# Patient Record
Sex: MALE | Race: BLACK OR AFRICAN AMERICAN | NOT HISPANIC OR LATINO | Employment: OTHER | URBAN - METROPOLITAN AREA
[De-identification: names, ages, dates, MRNs, and addresses within clinical notes are randomized per-mention and may not be internally consistent; named-entity substitution may affect disease eponyms.]

---

## 2019-03-04 ENCOUNTER — OFFICE VISIT (OUTPATIENT)
Dept: FAMILY MEDICINE | Facility: CLINIC | Age: 44
End: 2019-03-04
Payer: COMMERCIAL

## 2019-03-04 VITALS
TEMPERATURE: 98.1 F | HEIGHT: 66 IN | WEIGHT: 210 LBS | OXYGEN SATURATION: 99 % | DIASTOLIC BLOOD PRESSURE: 79 MMHG | BODY MASS INDEX: 33.75 KG/M2 | SYSTOLIC BLOOD PRESSURE: 113 MMHG | HEART RATE: 59 BPM

## 2019-03-04 DIAGNOSIS — Z23 NEED FOR PROPHYLACTIC VACCINATION AND INOCULATION AGAINST INFLUENZA: ICD-10-CM

## 2019-03-04 DIAGNOSIS — R53.83 FATIGUE, UNSPECIFIED TYPE: ICD-10-CM

## 2019-03-04 DIAGNOSIS — R06.83 SNORING: ICD-10-CM

## 2019-03-04 DIAGNOSIS — Z13.6 CARDIOVASCULAR SCREENING; LDL GOAL LESS THAN 160: ICD-10-CM

## 2019-03-04 DIAGNOSIS — J30.89 PERENNIAL ALLERGIC RHINITIS: Primary | ICD-10-CM

## 2019-03-04 DIAGNOSIS — R68.82 DECREASED LIBIDO: ICD-10-CM

## 2019-03-04 DIAGNOSIS — Z11.3 SCREEN FOR STD (SEXUALLY TRANSMITTED DISEASE): ICD-10-CM

## 2019-03-04 DIAGNOSIS — G47.19 EXCESSIVE DAYTIME SLEEPINESS: ICD-10-CM

## 2019-03-04 PROBLEM — E66.811 OBESITY, CLASS I, BMI 30-34.9: Status: ACTIVE | Noted: 2019-03-04

## 2019-03-04 LAB
CHOLEST SERPL-MCNC: 194 MG/DL
ERYTHROCYTE [DISTWIDTH] IN BLOOD BY AUTOMATED COUNT: 13.4 % (ref 10–15)
GLUCOSE SERPL-MCNC: 81 MG/DL (ref 70–99)
HCT VFR BLD AUTO: 45.1 % (ref 40–53)
HDLC SERPL-MCNC: 74 MG/DL
HGB BLD-MCNC: 15.1 G/DL (ref 13.3–17.7)
LDLC SERPL CALC-MCNC: 106 MG/DL
MCH RBC QN AUTO: 29 PG (ref 26.5–33)
MCHC RBC AUTO-ENTMCNC: 33.5 G/DL (ref 31.5–36.5)
MCV RBC AUTO: 87 FL (ref 78–100)
NONHDLC SERPL-MCNC: 120 MG/DL
PLATELET # BLD AUTO: 135 10E9/L (ref 150–450)
RBC # BLD AUTO: 5.21 10E12/L (ref 4.4–5.9)
TRIGL SERPL-MCNC: 72 MG/DL
TSH SERPL DL<=0.005 MIU/L-ACNC: 1.73 MU/L (ref 0.4–4)
WBC # BLD AUTO: 3.8 10E9/L (ref 4–11)

## 2019-03-04 PROCEDURE — 87491 CHLMYD TRACH DNA AMP PROBE: CPT | Performed by: FAMILY MEDICINE

## 2019-03-04 PROCEDURE — 86803 HEPATITIS C AB TEST: CPT | Performed by: FAMILY MEDICINE

## 2019-03-04 PROCEDURE — 0064U ANTB TP TOTAL&RPR IA QUAL: CPT | Performed by: FAMILY MEDICINE

## 2019-03-04 PROCEDURE — 87591 N.GONORRHOEAE DNA AMP PROB: CPT | Performed by: FAMILY MEDICINE

## 2019-03-04 PROCEDURE — 36415 COLL VENOUS BLD VENIPUNCTURE: CPT | Performed by: FAMILY MEDICINE

## 2019-03-04 PROCEDURE — 84443 ASSAY THYROID STIM HORMONE: CPT | Performed by: FAMILY MEDICINE

## 2019-03-04 PROCEDURE — 87389 HIV-1 AG W/HIV-1&-2 AB AG IA: CPT | Performed by: FAMILY MEDICINE

## 2019-03-04 PROCEDURE — 90471 IMMUNIZATION ADMIN: CPT | Performed by: FAMILY MEDICINE

## 2019-03-04 PROCEDURE — 80061 LIPID PANEL: CPT | Performed by: FAMILY MEDICINE

## 2019-03-04 PROCEDURE — 84403 ASSAY OF TOTAL TESTOSTERONE: CPT | Performed by: FAMILY MEDICINE

## 2019-03-04 PROCEDURE — 82947 ASSAY GLUCOSE BLOOD QUANT: CPT | Performed by: FAMILY MEDICINE

## 2019-03-04 PROCEDURE — 99214 OFFICE O/P EST MOD 30 MIN: CPT | Mod: 25 | Performed by: FAMILY MEDICINE

## 2019-03-04 PROCEDURE — 90686 IIV4 VACC NO PRSV 0.5 ML IM: CPT | Performed by: FAMILY MEDICINE

## 2019-03-04 PROCEDURE — 85027 COMPLETE CBC AUTOMATED: CPT | Performed by: FAMILY MEDICINE

## 2019-03-04 RX ORDER — CETIRIZINE HYDROCHLORIDE 10 MG/1
10 TABLET ORAL DAILY PRN
Qty: 365 TABLET | COMMUNITY
Start: 2019-03-04

## 2019-03-04 RX ORDER — FLUTICASONE PROPIONATE 50 MCG
2 SPRAY, SUSPENSION (ML) NASAL DAILY
Qty: 10 ML | Refills: 1 | Status: SHIPPED | OUTPATIENT
Start: 2019-03-04 | End: 2024-01-31

## 2019-03-04 ASSESSMENT — PAIN SCALES - GENERAL: PAINLEVEL: NO PAIN (0)

## 2019-03-04 ASSESSMENT — MIFFLIN-ST. JEOR: SCORE: 1780.3

## 2019-03-04 NOTE — PROGRESS NOTES
SUBJECTIVE:   Duran Santiago is a 43 year old male who presents to clinic today for the following health issues:      ALLERGIES     Onset: about 2 years    Description:   Nasal congestion: YES, nasal mucus from nose when temperature change (such as forced air heating, or cold nighttime air)  Sneezing: YES, especially when waking up in the morning  Red, itchy eyes: YES    Progression of Symptoms:  same worse    Accompanying Signs & Symptoms:  Cough: no  Wheezing: no  Rash: no  Sinus/facial pain: YES   History:   Is it seasonal: none   History of Asthma: no  Has allergy testing been done: no    Precipitating factors:   None    Alleviating factors:  None       Therapies Tried and outcome: drinking fluids, exercise regularly, no OTC meds      Decreased libido      Duration: over 1 year    Description (location/character/radiation): He says that this is mostly a decrease in interest in having sex. There is a little bit of ED, and most of this he considers to be related to feeling tired all the time.     Intensity:  Variable, off-and-on    Accompanying signs and symptoms: None    History (similar episodes/previous evaluation): None    Precipitating or alleviating factors: See below    Therapies tried and outcome: None     Tiredness      Duration: 9-10 months    Description (location/character/radiation): He finds it hard to do physical work.    Intensity:  Variable    Accompanying signs and symptoms: Questioning reveals that he snores heavily, enough that his wife complains about it.     History (similar episodes/previous evaluation): None    Precipitating or alleviating factors: None    Therapies tried and outcome: He goes to the gym and works out regularly. He notes that he has no difficulty doing the workouts, but it does not energize him later in the day.   THE EPWORTH SLEEPINESS SCALE    How likely are you to doze off or fall asleep in the following situations, in contrast to feeling just tired? This refers to your  "usual way of life in recent times. Even if you have not done some of these things recently try to work out how they would have affected you. Use the following scale to choose the most appropriate number for each situation:     0 = no chance of dozing  1 = slight chance of dozing  2 = moderate chance of dozing  3 = high chance of dozing    SITUATION        CHANCE OF DOZING  Sitting and reading       3  Watching TV        2  Sitting inactive in a public place (e.g a theater or a meeting)  2  As a passenger in a car for an hour without a break   1  Lying down to rest in the afternoon when circumstances permit 3  Sitting and talking to someone     0  Sitting quietly after a lunch without alcohol    3  In a car, while stopped for a few minutes in traffic   0     To check your sleepiness score, total the points    14    THE EPWORTH SLEEPINESS SCALE KEY    1 - 6   Congratulations, you are getting enough sleep!   7 - 8   Your score is average   9 and up  Seek the advice of a sleep specialist without delay      Past medical, family, and social histories, medications, and allergies are reviewed and updated in Epic.     ROS:  Constitutional, HEENT, cardiovascular, pulmonary, gi and gu systems are negative, except as otherwise noted.    This document serves as a record of the services and decisions personally performed by GALE DUNN. It was created on his/her behalf by Isabela Sheppard, a trained medical scribe. The creation of this document is based on the provider's statements to the medical scribe. Isabela hSeppard, March 5, 2019 10:57 AM    OBJECTIVE:     /79 (BP Location: Left arm, Patient Position: Chair, Cuff Size: Adult Large)   Pulse 59   Temp 98.1  F (36.7  C) (Oral)   Ht 1.676 m (5' 6\")   Wt 95.3 kg (210 lb)   SpO2 99%   BMI 33.89 kg/m    Body mass index is 33.89 kg/m .  GENERAL: healthy, alert and no distress  EYES: Eyes grossly normal to inspection, PERRL, EOMI, sclerae white and conjunctivae normal  RESP: " lungs clear to auscultation - no crackles or wheezes, no areas of dullness, no tachypnea  CV: Heart regular rate and rhythm without murmur, click or rub. No peripheral edema and peripheral pulses strong  MS: no gross musculoskeletal defects noted, no edema  SKIN: no suspicious lesions or rashes  NEURO: Normal strength and tone, sensory exam grossly normal, mentation intact, oriented times 3 and cranial nerves 2-12 intact  PSYCH: mentation appears normal, affect normal/bright       ASSESSMENT/PLAN:     (J30.89) Perennial allergic rhinitis  (primary encounter diagnosis)  Comment: Most of his symptoms sound like allergies.   Plan: cetirizine (ZYRTEC) 10 MG tablet, fluticasone         (FLONASE) 50 MCG/ACT nasal spray        I encourages him to use the nose spray continuously throughout the year, but he can decrease to one spray as symptoms improve. Follow up PRN.    (R53.83) Fatigue, unspecified type  (R68.82) Decreased libido  Comment: I am screening for a metabolic cause to his nonspecific complaint, but assuming the labs are normal, I believe this is a manifestation of the main problem described below.   Plan: CBC with platelets, TSH with free T4 reflex,         Glucose, Testosterone, total            (G47.19) Excessive daytime sleepiness  (R06.83) Snoring  Comment: I think he has WALI.   Plan: SLEEP EVALUATION & MANAGEMENT REFERRAL - [facility]          [phone] (Age 15 and up)            (Z11.3) Screen for STD (sexually transmitted disease)  Comment: Asymptomatic screening offered and accepted by the patient.   Plan: Chlamydia trachomatis PCR, Neisseria         gonorrhoeae PCR, Hepatitis C Screen Reflex to         HCV RNA Quant and Genotype, HIV Antigen         Antibody Combo, Treponema Abs w Reflex to RPR         and Titer            (Z13.6) CARDIOVASCULAR SCREENING; LDL GOAL LESS THAN 160  Comment: Fasting.  Plan: Lipid panel reflex to direct LDL Non-fasting             (Z23) Need for prophylactic vaccination and inoculation against influenza  Comment: Influenza vaccine offered and accepted by patient. He has received it before without problems.  Plan: HC FLU VAC PRESRV FREE QUAD SPLIT VIR 3+YRS IM            The information in this document, created by the medical scribe Isabela Sheppard for me, accurately reflects the services I personally performed and the decisions made by me. I have reviewed and approved this document for accuracy prior to leaving the patient care area.    Brock Still MD

## 2019-03-04 NOTE — PROGRESS NOTES

## 2019-03-04 NOTE — PATIENT INSTRUCTIONS
================================================================================  Normal Values   Blood pressure  <140/90 for most adults    <130/80 for some chronic diseases (ask your care team about yours)    BMI (body mass index)  18.5-25 kg/m2 (based on height and weight)     Thank you for visiting [facility]    Normal or non-critical lab and imaging results will be communicated to you by MyChart, letter or phone within 7 days.  If you do not hear from us within 10 days, please call the clinic. If you have a critical or abnormal lab result, we will notify you by phone as soon as possible.     If you have any questions regarding your visit please contact:     Team Comfort:   Clinic Hours Telephone Number   Dr. Brock Dorado Dr. [place] 7am-5pm  Monday - Friday [phone]  Heather RN  Nory RN  Akanksha RN   Pharmacy 8:00am-8pm Monday-Friday    9am-5pm Saturday-Sunday [phone]   Urgent Care 11am-9pm Monday-Friday        9am-5pm Saturday-Sunday [phone]     After hours, weekend or if you need to make an appointment with your primary provider please call [phone].   After Hours nurse advise: call [facility]: [phone]    Medication Refills:  Call your pharmacy and they will forward the refill to us. Please allow 3 business days for your refills to be completed.

## 2019-03-05 LAB
C TRACH DNA SPEC QL NAA+PROBE: NEGATIVE
HCV AB SERPL QL IA: NONREACTIVE
HIV 1+2 AB+HIV1 P24 AG SERPL QL IA: NONREACTIVE
N GONORRHOEA DNA SPEC QL NAA+PROBE: NEGATIVE
SPECIMEN SOURCE: NORMAL
SPECIMEN SOURCE: NORMAL
T PALLIDUM AB SER QL: NONREACTIVE

## 2019-03-06 LAB — TESTOST SERPL-MCNC: 436 NG/DL (ref 240–950)

## 2019-03-27 ENCOUNTER — OFFICE VISIT (OUTPATIENT)
Dept: OPTOMETRY | Facility: CLINIC | Age: 44
End: 2019-03-27
Payer: COMMERCIAL

## 2019-03-27 ENCOUNTER — APPOINTMENT (OUTPATIENT)
Dept: OPTOMETRY | Facility: CLINIC | Age: 44
End: 2019-03-27
Payer: COMMERCIAL

## 2019-03-27 DIAGNOSIS — H52.4 MYOPIA OF BOTH EYES WITH ASTIGMATISM AND PRESBYOPIA: Primary | ICD-10-CM

## 2019-03-27 DIAGNOSIS — H52.13 MYOPIA OF BOTH EYES WITH ASTIGMATISM AND PRESBYOPIA: Primary | ICD-10-CM

## 2019-03-27 DIAGNOSIS — H52.203 MYOPIA OF BOTH EYES WITH ASTIGMATISM AND PRESBYOPIA: Primary | ICD-10-CM

## 2019-03-27 DIAGNOSIS — H10.13 ALLERGIC CONJUNCTIVITIS OF BOTH EYES: ICD-10-CM

## 2019-03-27 PROCEDURE — 92340 FIT SPECTACLES MONOFOCAL: CPT | Performed by: OPTOMETRIST

## 2019-03-27 PROCEDURE — 92004 COMPRE OPH EXAM NEW PT 1/>: CPT | Performed by: OPTOMETRIST

## 2019-03-27 PROCEDURE — 92015 DETERMINE REFRACTIVE STATE: CPT | Performed by: OPTOMETRIST

## 2019-03-27 ASSESSMENT — REFRACTION_MANIFEST
OD_CYLINDER: +0.25
OS_ADD: +1.50
OS_CYLINDER: +1.00
OS_AXIS: 111
OD_CYLINDER: +0.50
OD_ADD: +1.50
OS_SPHERE: -1.75
OS_CYLINDER: +0.50
OD_AXIS: 052
OD_SPHERE: -1.50
METHOD_AUTOREFRACTION: 1
OS_AXIS: 115
OS_SPHERE: -1.50
OD_SPHERE: -1.50
OD_AXIS: 041

## 2019-03-27 ASSESSMENT — VISUAL ACUITY
OS_SC: 20/40
OD_SC: 20/40
OD_SC: 20/20 -2
OS_SC+: -2
OS_SC: 20/25 -1
METHOD: SNELLEN - LINEAR

## 2019-03-27 ASSESSMENT — EXTERNAL EXAM - RIGHT EYE: OD_EXAM: NORMAL

## 2019-03-27 ASSESSMENT — CUP TO DISC RATIO
OS_RATIO: 0.4
OD_RATIO: 0.3

## 2019-03-27 ASSESSMENT — CONF VISUAL FIELD
OS_NORMAL: 1
OD_NORMAL: 1

## 2019-03-27 ASSESSMENT — TONOMETRY
OD_IOP_MMHG: 18
OS_IOP_MMHG: 21
IOP_METHOD: APPLANATION

## 2019-03-27 ASSESSMENT — SLIT LAMP EXAM - LIDS
COMMENTS: NORMAL
COMMENTS: NORMAL

## 2019-03-27 ASSESSMENT — EXTERNAL EXAM - LEFT EYE: OS_EXAM: NORMAL

## 2019-03-27 NOTE — PATIENT INSTRUCTIONS
Prescription given for glasses. Option of distance only glasses which you will have to take off for near tasks or bifocal glasses.    Zaditor over the counter drops twice a day as needed for itching from allergies.    Your eyes may be blurry at near and sensitive to light for several hours from the dilating drops.    Yearly eye exams recommended.    Thank you!

## 2019-03-27 NOTE — PROGRESS NOTES
Chief Complaint   Patient presents with     COMPREHENSIVE EYE EXAM         Last Eye Exam: first eye exam  Dilated Previously: No, side effects of dilation explained today    What are you currently using to see?  does not use glasses or contacts       Distance Vision Acuity: Satisfied with vision    Near Vision Acuity: Not satisfied     Eye Comfort: good  Do you use eye drops? : No  Occupation or Hobbies:     [employer]         Medical, surgical and family histories reviewed and updated 3/27/2019.       OBJECTIVE: See Ophthalmology exam    ASSESSMENT:    ICD-10-CM    1. Myopia of both eyes with astigmatism and presbyopia H52.13     H52.203     H52.4    2. Allergic conjunctivitis of both eyes H10.13       PLAN:     Patient Instructions   Prescription given for glasses. Option of distance only glasses which you will have to take off for near tasks or bifocal glasses.    Zaditor over the counter drops twice a day as needed for itching from allergies.    Your eyes may be blurry at near and sensitive to light for several hours from the dilating drops.    Yearly eye exams recommended.    Thank you!

## 2019-03-27 NOTE — LETTER
3/27/2019         RE: Duran Santiago  [address]  [place] [address]        Dear Colleague,    Thank you for referring your patient, Duran Santiago, to the [facility]. Please see a copy of my visit note below.    Chief Complaint   Patient presents with     COMPREHENSIVE EYE EXAM         Last Eye Exam: first eye exam  Dilated Previously: No, side effects of dilation explained today    What are you currently using to see?  does not use glasses or contacts       Distance Vision Acuity: Satisfied with vision    Near Vision Acuity: Not satisfied     Eye Comfort: good  Do you use eye drops? : No  Occupation or Hobbies:     [employer]         Medical, surgical and family histories reviewed and updated 3/27/2019.       OBJECTIVE: See Ophthalmology exam    ASSESSMENT:    ICD-10-CM    1. Myopia of both eyes with astigmatism and presbyopia H52.13     H52.203     H52.4    2. Allergic conjunctivitis of both eyes H10.13       PLAN:     Patient Instructions   Prescription given for glasses. Option of distance only glasses which you will have to take off for near tasks or bifocal glasses.    Zaditor over the counter drops twice a day as needed for itching from allergies.    Your eyes may be blurry at near and sensitive to light for several hours from the dilating drops.    Yearly eye exams recommended.    Thank you!         Again, thank you for allowing me to participate in the care of your patient.        Sincerely,        Angela Miller OD

## 2020-02-24 ENCOUNTER — HEALTH MAINTENANCE LETTER (OUTPATIENT)
Age: 45
End: 2020-02-24

## 2020-12-13 ENCOUNTER — HEALTH MAINTENANCE LETTER (OUTPATIENT)
Age: 45
End: 2020-12-13

## 2021-04-17 ENCOUNTER — HEALTH MAINTENANCE LETTER (OUTPATIENT)
Age: 46
End: 2021-04-17

## 2021-05-31 ENCOUNTER — OFFICE VISIT (OUTPATIENT)
Dept: URGENT CARE | Facility: URGENT CARE | Age: 46
End: 2021-05-31

## 2021-05-31 ENCOUNTER — NURSE TRIAGE (OUTPATIENT)
Dept: NURSING | Facility: CLINIC | Age: 46
End: 2021-05-31

## 2021-05-31 VITALS
SYSTOLIC BLOOD PRESSURE: 118 MMHG | OXYGEN SATURATION: 100 % | RESPIRATION RATE: 20 BRPM | TEMPERATURE: 97.8 F | WEIGHT: 231.2 LBS | DIASTOLIC BLOOD PRESSURE: 76 MMHG | BODY MASS INDEX: 37.32 KG/M2 | HEART RATE: 82 BPM

## 2021-05-31 DIAGNOSIS — K21.9 GASTROESOPHAGEAL REFLUX DISEASE WITHOUT ESOPHAGITIS: Primary | ICD-10-CM

## 2021-05-31 PROCEDURE — 99203 OFFICE O/P NEW LOW 30 MIN: CPT | Performed by: PREVENTIVE MEDICINE

## 2021-05-31 RX ORDER — OMEPRAZOLE 20 MG/1
20 TABLET, DELAYED RELEASE ORAL DAILY
Qty: 30 TABLET | Refills: 0 | Status: SHIPPED | OUTPATIENT
Start: 2021-05-31 | End: 2021-06-30

## 2021-05-31 NOTE — TELEPHONE ENCOUNTER
"Caller reports he is experiencing pain in middle chest when he swallows and burning sensation in chest and stomach. Happens with  all solid foods. No problem with  Full liquid or soft foods   Present and worsening over past 2 weeks;   denies any episodes of food sticking where he has  had to vomit    Triage protocol reviewed   Advised in home care and to be seen in clinic within 24 hrs   Caller understands  and will comply  Call transferred to      Johana Redd RN  FNA           Reason for Disposition    [1] Patient claims chest pain is same as previously diagnosed \"heartburn\" AND [2] describes burning in chest AND [3] accompanying sour taste in mouth    Additional Information    Negative: Severe difficulty breathing (e.g., struggling for each breath, speaks in single words)    Negative: Difficult to awaken or acting confused (e.g., disoriented, slurred speech)    Negative: Shock suspected (e.g., cold/pale/clammy skin, too weak to stand, low BP, rapid pulse)    Negative: [1] Chest pain lasts > 5 minutes AND [2] history of heart disease  (i.e., heart attack, bypass surgery, angina, angioplasty, CHF; not just a heart murmur)    Negative: [1] Chest pain lasts > 5 minutes AND [2] described as crushing, pressure-like, or heavy    Negative: [1] Chest pain lasts > 5 minutes AND [2] age > 50    Negative: [1] Chest pain lasts > 5 minutes AND [2] age > 30 AND [3] at least one cardiac risk factor (i.e., hypertension, diabetes, obesity, smoker or strong family history of heart disease)    Negative: [1] Chest pain lasts > 5 minutes AND [2] not relieved with nitroglycerin    Negative: Passed out (i.e., lost consciousness, collapsed and was not responding)    Negative: Heart beating < 50 beats per minute OR > 140 beats per minute    Negative: Visible sweat on face or sweat dripping down face    Negative: Sounds like a life-threatening emergency to the triager    Negative: Followed a chest injury    Negative: SEVERE " "chest pain    Negative: [1] Intermittent  chest pain or \"angina\" AND [2] increasing in severity or frequency  (Exception: pains lasting a few seconds)    Negative: Pain also present in shoulder(s) or arm(s) or jaw  (Exception: pain is clearly made worse by movement)    Negative: Difficulty breathing    Negative: Dizziness or lightheadedness    Negative: Coughing up blood    Negative: Cocaine use within last 3 days    Negative: History of prior \"blood clot\" in leg or lungs (i.e., deep vein thrombosis, pulmonary embolism)    Negative: Recent illness requiring prolonged bedrest (i.e., immobilization)    Negative: Hip or leg fracture in past 2 months (e.g., had cast on leg or ankle)    Negative: Major surgery in the past month    Negative: Recent long-distance travel with prolonged time in car, bus, plane, or train (i.e., within past 2 weeks; 6 or  more hours duration)    Negative: Chest pain lasts > 5 minutes (Exceptions: chest pain occurring > 3 days ago and now asymptomatic; same as previously diagnosed heartburn and has accompanying sour taste in mouth)    Negative: Taking a deep breath makes pain worse    Negative: Patient sounds very sick or weak to the triager    Negative: [1] Chest pain lasts > 5 minutes AND [2] occurred > 3 days ago (72 hours) AND [3] NO chest pain or cardiac symptoms now    Negative: [1] Chest pain lasting <= 5 minutes AND [2] NO chest pain or cardiac symptoms now(Exceptions: pains lasting a few seconds)    Negative: Fever > 100.5 F (38.1 C)    Negative: Rash in same area as pain (may be described as \"small blisters\")    Protocols used: CHEST PAIN-A-AH      "

## 2021-05-31 NOTE — PATIENT INSTRUCTIONS
Take omeprazole 20 mg daily to help treat heartburn.  Follow up with Dr Still tomorrow to get an egd (upper endoscopy) to further evaluate.  Use TUMS for flares in symptoms.  Elevated head of bed at night.  Avoid coffee.  Eat soft, bland foods until improves.

## 2021-06-01 ENCOUNTER — TELEPHONE (OUTPATIENT)
Dept: URGENT CARE | Facility: URGENT CARE | Age: 46
End: 2021-06-01

## 2021-06-01 NOTE — TELEPHONE ENCOUNTER
Plan does not cover omeprazole (PRILOSEC OTC) 20 MG EC tablet.  Please call [phone] to initiate Prior Auth or change med.    Insurance type and ID number: ID# [id number]      Additional Information:     Priya Slaughter

## 2021-06-01 NOTE — PROGRESS NOTES
Assessment & Plan     1. Gastroesophageal reflux disease without esophagitis  ddx gerd, esophageal stricture, infectious esophagitis, esophageal web, eosinophilic esophagitis, achalasia  - omeprazole (PRILOSEC OTC) 20 MG EC tablet; Take 1 tablet (20 mg) by mouth daily  Dispense: 30 tablet; Refill: 0  Take omeprazole 20 mg daily to help treat heartburn.  Follow up with Dr Still tomorrow toconsider further evaluation with an egd (upper endoscopy), barium swallow to further evaluate.  Use TUMS for flares in symptoms.  Elevated head of bed at night.  Avoid coffee.  Eat soft, bland foods until improves.          No follow-ups on file.    Rob Tidwell MD  [facility]    Subjective     Duran Santiago is a 46 year old year old male who presents to clinic today for the following health issues:    Patient presents with:  Chest Pain: feel pain when eating anything solid and burns when drinking cold, even swallowing saliva feel painful going down         HPI  Patient has had on and off pain with swallowing and feeling of fullness in his esophagus for months.  Worse in the past week.  Severe pain with swallowing, particularly coffee which he has started drinking recently.  Also has some heartburn when he lays down at night.  No smoking, alcohol or nsaids use.  No n/v/c/d/epigastric pain.  No coughing when trying to swallow.    Patient Active Problem List   Diagnosis     CARDIOVASCULAR SCREENING; LDL GOAL LESS THAN 160     Perennial allergic rhinitis     Obesity, Class I, BMI 30-34.9       Current Outpatient Medications   Medication     omeprazole (PRILOSEC OTC) 20 MG EC tablet     cetirizine (ZYRTEC) 10 MG tablet     fluticasone (FLONASE) 50 MCG/ACT nasal spray     No current facility-administered medications for this visit.        Past Medical History:   Diagnosis Date     History of hepatitis B     Immune     Recurrent genital herpes        Social History   reports that he has never smoked.  He has never used smokeless tobacco. He reports that he does not drink alcohol or use drugs.    Family History   Problem Relation Age of Onset     Asthma No family hx of      C.A.D. No family hx of      Diabetes No family hx of      Hypertension No family hx of      Cerebrovascular Disease No family hx of      Cancer No family hx of        Review of Systems  Constitutional, HEENT, cardiovascular, pulmonary, GI, , musculoskeletal, neuro, skin, endocrine and psych systems are negative, except as otherwise noted.      Objective    /76   Pulse 82   Temp 97.8  F (36.6  C) (Tympanic)   Resp 20   Wt 104.9 kg (231 lb 3.2 oz)   SpO2 100%   BMI 37.32 kg/m    Physical Exam   GENERAL: healthy, alert and no distress  EYES: Eyes grossly normal to inspection, PERRL and conjunctivae and sclerae normal  HENT: ear canals and TM's normal, nose and mouth without ulcers or lesions  NECK: no adenopathy, no asymmetry, masses, or scars and thyroid normal to palpation  RESP: lungs clear to auscultation - no rales, rhonchi or wheezes  CV: regular rate and rhythm, normal S1 S2, no S3 or S4, no murmur, click or rub, no peripheral edema and peripheral pulses strong  ABDOMEN: soft, nontender, no hepatosplenomegaly, no masses and bowel sounds normal  MS: no gross musculoskeletal defects noted, no edema  SKIN: no suspicious lesions or rashes  NEURO: Normal strength and tone, mentation intact and speech normal  PSYCH: mentation appears normal, affect normal/bright

## 2021-09-26 ENCOUNTER — HEALTH MAINTENANCE LETTER (OUTPATIENT)
Age: 46
End: 2021-09-26

## 2022-03-28 ENCOUNTER — NURSE TRIAGE (OUTPATIENT)
Dept: FAMILY MEDICINE | Facility: CLINIC | Age: 47
End: 2022-03-28
Payer: COMMERCIAL

## 2022-03-28 ENCOUNTER — OFFICE VISIT (OUTPATIENT)
Dept: URGENT CARE | Facility: URGENT CARE | Age: 47
End: 2022-03-28
Payer: COMMERCIAL

## 2022-03-28 VITALS
SYSTOLIC BLOOD PRESSURE: 126 MMHG | TEMPERATURE: 97.8 F | OXYGEN SATURATION: 98 % | DIASTOLIC BLOOD PRESSURE: 87 MMHG | HEART RATE: 64 BPM | BODY MASS INDEX: 34.51 KG/M2 | WEIGHT: 213.8 LBS

## 2022-03-28 DIAGNOSIS — M25.511 ACUTE PAIN OF RIGHT SHOULDER: Primary | ICD-10-CM

## 2022-03-28 PROCEDURE — 99213 OFFICE O/P EST LOW 20 MIN: CPT | Performed by: PHYSICIAN ASSISTANT

## 2022-03-28 RX ORDER — IBUPROFEN 600 MG/1
600 TABLET, FILM COATED ORAL EVERY 6 HOURS PRN
Qty: 30 TABLET | Refills: 0 | Status: SHIPPED | OUTPATIENT
Start: 2022-03-28

## 2022-03-28 ASSESSMENT — ENCOUNTER SYMPTOMS
WOUND: 0
MYALGIAS: 1
COUGH: 0
ARTHRALGIAS: 1
CONSTITUTIONAL NEGATIVE: 1
CHEST TIGHTNESS: 0
COLOR CHANGE: 0
FATIGUE: 0
FEVER: 0
CHILLS: 0
BACK PAIN: 1
NECK PAIN: 0
NECK STIFFNESS: 0
JOINT SWELLING: 0
SHORTNESS OF BREATH: 0
PALPITATIONS: 0
WHEEZING: 0

## 2022-03-28 NOTE — PROGRESS NOTES
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  [place] lot of heavy lifting international luggage/packages.   Recent illness:  no  Previous similar problem: no  Previous evaluation:  no  Precipitating or alleviating factors:  Aggravating factors include: lifting, overuse  Therapies tried and outcome: rest/inactivity, heat, ice, immobilization, acetaminophen, Ibuprofen, with minimal relief  Patient Active Problem List   Diagnosis     CARDIOVASCULAR SCREENING; LDL GOAL LESS THAN 160     Perennial allergic rhinitis     Obesity, Class I, BMI 30-34.9     Current Outpatient Medications   Medication     cetirizine (ZYRTEC) 10 MG tablet     fluticasone (FLONASE) 50 MCG/ACT nasal spray     No current facility-administered medications for this visit.        Allergies   Allergen Reactions     Seasonal Allergies        Review of Systems   Constitutional: Negative.  Negative for chills, fatigue and fever.   Respiratory: Negative for cough, chest tightness, shortness of breath and wheezing.    Cardiovascular: Negative for chest pain, palpitations and peripheral edema.   Musculoskeletal: Positive for arthralgias, back pain and myalgias. Negative for gait problem, joint swelling, neck pain and neck stiffness.   Skin: Negative for color change, pallor, rash and wound.   All other systems reviewed and are negative.           Objective    /87   Pulse 64   Temp 97.8  F (36.6  C) (Tympanic)   Wt 97 kg (213 lb 12.8 oz)   SpO2 98%   BMI 34.51  kg/m    Body mass index is 34.51 kg/m .  Physical Exam  Vitals and nursing note reviewed.   Constitutional:       General: He is not in acute distress.     Appearance: Normal appearance. He is well-developed and normal weight. He is not ill-appearing.   Musculoskeletal:      Right shoulder: Tenderness and bony tenderness present. No swelling, deformity, effusion, laceration or crepitus. Normal range of motion. Normal strength. Normal pulse.      Left shoulder: Normal. No swelling, deformity, effusion, tenderness, bony tenderness or crepitus. Normal range of motion. Normal strength. Normal pulse.      Right hand: Normal. No swelling or deformity. Normal range of motion. Normal strength. Normal sensation. There is no disruption of two-point discrimination. Normal capillary refill. Normal pulse.   Skin:     General: Skin is warm.      Capillary Refill: Capillary refill takes less than 2 seconds.   Neurological:      Mental Status: He is alert and oriented to person, place, and time.      Sensory: Sensation is intact. No sensory deficit.      Motor: Motor function is intact.      Gait: Gait is intact. Gait normal.      Deep Tendon Reflexes: Reflexes are normal and symmetric.   Psychiatric:         Mood and Affect: Mood normal.         Behavior: Behavior normal.         Thought Content: Thought content normal.         Judgment: Judgment normal.       No results found for this or any previous visit (from the past 24 hour(s)).  3V of R shoulder:  No acute fractures or dislocations.  No soft tissue swelling or masses.  Per my read.  Will send for overread.      Assessment/Plan:  Acute pain of right shoulder:  Xrays are negative for acute fractures or dislocations. Most likely strain/sprain vs rotator cuff.  Recommend RICE and will give ibuprofen prn pain.  Will also send to orthopedics for further evaluation and management.  Work restrictions given to patient.  Recheck in clinic if symptoms worsen or if symptoms do not  improve.   -     XR Shoulder Right G/E 3 Views  -     Orthopedic  Referral  -     ibuprofen (ADVIL/MOTRIN) 600 MG tablet; Take 1 tablet (600 mg) by mouth every 6 hours as needed for moderate pain        Jane See CHAN Cannon

## 2022-03-28 NOTE — LETTER
[facility] CARE [address] [address]    2022    Re: Duran Santiago  [address]  [place] [address]  [phone] (home)     : 1975      To Whom It May Concern:      Duran Santiago was seen in clinic today and I would recommend no heavy lifting greater than 5lbs with the right arm until he has seen the orthopedic specialist.  Please feel free to contact me via phone if you have any questions or concerns.        Sincerely,      Jane See CHAN Cannon

## 2022-03-28 NOTE — TELEPHONE ENCOUNTER
Patient stated he ifted a heavy object at work last Thursday.  He verbalized since lifting at work, his right arm/ shoulder has been increasingly been getting irritated and will hurt into his chest during activity.  Advised patient to go to urgent care at [facility] for hands on assessment and to make them aware this injury happened on the job.  Advised patient to seek emergency care per protocol for worsening symptoms.  Patient stated understanding.      Reason for Disposition    Patient wants to be seen    Shoulder pain    Additional Information    Negative: Shock suspected (e.g., cold/pale/clammy skin, too weak to stand, low BP, rapid pulse)    Negative: Similar pain previously and it was from 'heart attack'    Negative: Similar pain previously and it was from 'angina' and not relieved by nitroglycerin    Negative: Sounds like a life-threatening emergency to the triager    Negative: Chest pain    Negative: Followed an injury to shoulder    Negative: Difficulty breathing or unusual sweating (e.g., sweating without exertion)    Negative: Pain lasting > 5 minutes and pain also present in chest (Exception: pain is clearly made worse by movement)    Negative: Age > 40 and no obvious cause and pain even when not moving the arm (Exception: pain is clearly made worse by moving arm or bending neck)    Negative: Red area or streak and fever    Negative: Swollen joint and fever    Negative: Entire arm is swollen    Negative: Patient sounds very sick or weak to the triager    Negative: SEVERE pain (e.g., excruciating, unable to do any normal activities)    Negative: Shoulder pains with exertion (e.g., walking) and pain goes away on resting and not present now    Negative: Painful rash with multiple small blisters grouped together (i.e., dermatomal distribution or 'band' or 'stripe')    Negative: Looks like a boil, infected sore, deep ulcer or other infected rash (spreading redness, pus)    Negative:  "Localized rash is very painful (no fever)    Negative: Weakness (i.e., loss of strength) in hand or fingers (Exception: not truly weak; hand feels weak because of pain)    Negative: Numbness (i.e., loss of sensation) in hand or fingers    Negative: Unable to use arm at all and because of shoulder pain or stiffness    Answer Assessment - Initial Assessment Questions  1. ONSET: \"When did the pain start?\"      Approximately 4 days ago    2. LOCATION: \"Where is the pain located?\"      Right arm/ shoulder will travel to right side of chest    3. PAIN: \"How bad is the pain?\" (Scale 1-10; or mild, moderate, severe)    - MILD (1-3): doesn't interfere with normal activities    - MODERATE (4-7): interferes with normal activities (e.g., work or school) or awakens from sleep    - SEVERE (8-10): excruciating pain, unable to do any normal activities, unable to move arm at all due to pain      4/10    4. WORK OR EXERCISE: \"Has there been any recent work or exercise that involved this part of the body?\"      Yes[place]    5. CAUSE: \"What do you think is causing the shoulder pain?\"      Pulled muscle    6. OTHER SYMPTOMS: \"Do you have any other symptoms?\" (e.g., neck pain, swelling, rash, fever, numbness, weakness)      Right side of neck    7. PREGNANCY: \"Is there any chance you are pregnant?\" \"When was your last menstrual period?\"      NA    Protocols used: SHOULDER PAIN-A-[place]  Angela Florez RN      "

## 2022-05-08 ENCOUNTER — HEALTH MAINTENANCE LETTER (OUTPATIENT)
Age: 47
End: 2022-05-08

## 2022-10-20 ENCOUNTER — OFFICE VISIT (OUTPATIENT)
Dept: OPTOMETRY | Facility: CLINIC | Age: 47
End: 2022-10-20
Payer: COMMERCIAL

## 2022-10-20 DIAGNOSIS — H52.4 PRESBYOPIA: ICD-10-CM

## 2022-10-20 DIAGNOSIS — H52.13 MYOPIA OF BOTH EYES: Primary | ICD-10-CM

## 2022-10-20 DIAGNOSIS — H52.223 REGULAR ASTIGMATISM OF BOTH EYES: ICD-10-CM

## 2022-10-20 PROCEDURE — 92004 COMPRE OPH EXAM NEW PT 1/>: CPT | Performed by: OPTOMETRIST

## 2022-10-20 PROCEDURE — 92015 DETERMINE REFRACTIVE STATE: CPT | Performed by: OPTOMETRIST

## 2022-10-20 ASSESSMENT — KERATOMETRY
OS_AXISANGLE_DEGREES: 94
OS_K1POWER_DIOPTERS: 43.00
OD_K1POWER_DIOPTERS: 43.00
OD_K2POWER_DIOPTERS: 44.00
OD_AXISANGLE_DEGREES: 91
OD_AXISANGLE2_DEGREES: 1
OS_AXISANGLE2_DEGREES: 4
OS_K2POWER_DIOPTERS: 44.25

## 2022-10-20 ASSESSMENT — VISUAL ACUITY
OD_SC: 20/20
OD_SC: 20/70
OS_SC: 20/80
OS_PH_SC+: -2
OS_SC: 20/20
METHOD: SNELLEN - LINEAR
OD_PH_SC: 20/25
OS_PH_SC: 20/25

## 2022-10-20 ASSESSMENT — CONF VISUAL FIELD
OD_SUPERIOR_TEMPORAL_RESTRICTION: 0
OS_INFERIOR_NASAL_RESTRICTION: 0
OD_NORMAL: 1
OS_SUPERIOR_NASAL_RESTRICTION: 0
OS_INFERIOR_TEMPORAL_RESTRICTION: 0
OD_SUPERIOR_NASAL_RESTRICTION: 0
OD_INFERIOR_TEMPORAL_RESTRICTION: 0
OS_NORMAL: 1
OS_SUPERIOR_TEMPORAL_RESTRICTION: 0
OD_INFERIOR_NASAL_RESTRICTION: 0

## 2022-10-20 ASSESSMENT — REFRACTION_MANIFEST
OD_CYLINDER: +0.25
OD_SPHERE: -1.50
OS_ADD: +2.50
OD_AXIS: 059
OS_SPHERE: -1.75
OS_AXIS: 120
OS_CYLINDER: +0.75
OD_CYLINDER: +0.25
OD_ADD: +2.50
OS_CYLINDER: +0.75
OS_SPHERE: -2.00
OD_SPHERE: -1.50
OS_AXIS: 116
METHOD_AUTOREFRACTION: 1
OD_AXIS: 060

## 2022-10-20 ASSESSMENT — SLIT LAMP EXAM - LIDS
COMMENTS: NORMAL
COMMENTS: NORMAL

## 2022-10-20 ASSESSMENT — EXTERNAL EXAM - LEFT EYE: OS_EXAM: NORMAL

## 2022-10-20 ASSESSMENT — TONOMETRY
IOP_METHOD: APPLANATION
OS_IOP_MMHG: 16
OD_IOP_MMHG: 16

## 2022-10-20 ASSESSMENT — EXTERNAL EXAM - RIGHT EYE: OD_EXAM: NORMAL

## 2022-10-20 ASSESSMENT — CUP TO DISC RATIO
OS_RATIO: 0.4
OD_RATIO: 0.3

## 2022-10-20 NOTE — PATIENT INSTRUCTIONS
Myopia is a result of long eyes. It is commonly referred to as near-sightedness. Seeing clearly in the distance is the main challenge.    Astigmatism results from curvature differential in the cornea and crystalline lens which can cause a distorted image, as light rays are prevented from meeting at a common focus.    Presbyopia is the diagnosis. Presbyopia is an age-related condition where the eye's crystalline lens doesn't change shape as easily as it once did.    Eyeglass prescription given.    The affects of the dilating drops last for 4- 6 hours.  You will be more sensitive to light and vision will be blurry up close.  Do not drive if you do not feel comfortable.  Mydriatic sunglasses were given if needed.    Recommend annual eye exams.    Nury Tidwell O.D.  [address] [id number] [phone]

## 2022-10-20 NOTE — PROGRESS NOTES
Chief Complaint   Patient presents with     Annual Eye Exam         Last Eye Exam: 03/27/2019  Dilated Previously: Yes    What are you currently using to see?  Glasses- broke a while ago        Distance Vision Acuity: Satisfied with vision    Near Vision Acuity: Not satisfied     Eye Comfort: good  Do you use eye drops? 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       OBJECTIVE: See Ophthalmology exam    ASSESSMENT:    ICD-10-CM    1. Myopia of both eyes - Both Eyes  H52.13 REFRACTION     EYE EXAM (SIMPLE-NONBILLABLE)      2. Regular astigmatism of both eyes - Both Eyes  H52.223 REFRACTION     EYE EXAM (SIMPLE-NONBILLABLE)      3. Presbyopia - Both Eyes  H52.4 REFRACTION     EYE EXAM (SIMPLE-NONBILLABLE)          PLAN:     Patient Instructions   Myopia is a result of long eyes. It is commonly referred to as near-sightedness. Seeing clearly in the distance is the main challenge.    Astigmatism results from curvature differential in the cornea and crystalline lens which can cause a distorted image, as light rays are prevented from meeting at a common focus.    Presbyopia is the diagnosis. Presbyopia is an age-related condition where the eye's crystalline lens doesn't change shape as easily as it once did.    Eyeglass prescription given.    The affects of the dilating drops last for 4- 6 hours.  You will be more sensitive to light and vision will be blurry up close.  Do not drive if you do not feel comfortable.  Mydriatic sunglasses were given if needed.    Recommend annual eye exams.    Nury Tidwell O.D.  [address] [address]    [phone]       
Well-developed, well nourished

## 2022-10-20 NOTE — LETTER
10/20/2022         RE: Duran Santiago  [address]  [facility] [address]        Dear Colleague,    Thank you for referring your patient, Duran Santiago, to the [facility]. Please see a copy of my visit note below.    Chief Complaint   Patient presents with     Annual Eye Exam         Last Eye Exam: 03/27/2019  Dilated Previously: Yes    What are you currently using to see?  Glasses- broke a while ago        Distance Vision Acuity: Satisfied with vision    Near Vision Acuity: Not satisfied     Eye Comfort: good  Do you use eye drops? 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       OBJECTIVE: See Ophthalmology exam    ASSESSMENT:    ICD-10-CM    1. Myopia of both eyes - Both Eyes  H52.13 REFRACTION     EYE EXAM (SIMPLE-NONBILLABLE)      2. Regular astigmatism of both eyes - Both Eyes  H52.223 REFRACTION     EYE EXAM (SIMPLE-NONBILLABLE)      3. Presbyopia - Both Eyes  H52.4 REFRACTION     EYE EXAM (SIMPLE-NONBILLABLE)          PLAN:     Patient Instructions   Myopia is a result of long eyes. It is commonly referred to as near-sightedness. Seeing clearly in the distance is the main challenge.    Astigmatism results from curvature differential in the cornea and crystalline lens which can cause a distorted image, as light rays are prevented from meeting at a common focus.    Presbyopia is the diagnosis. Presbyopia is an age-related condition where the eye's crystalline lens doesn't change shape as easily as it once did.    Eyeglass prescription given.    The affects of the dilating drops last for 4- 6 hours.  You will be more sensitive to light and vision will be blurry up close.  Do not drive if you do not feel comfortable.  Mydriatic sunglasses were given if needed.    Recommend annual eye exams.    WALTER Gomez  [place]   [address] [place] [address]    [phone]           Again, thank you for allowing me to participate in the care of your patient.        Sincerely,        Nury Tidwell OD

## 2022-12-16 ENCOUNTER — OFFICE VISIT (OUTPATIENT)
Dept: FAMILY MEDICINE | Facility: CLINIC | Age: 47
End: 2022-12-16
Payer: COMMERCIAL

## 2022-12-16 VITALS
WEIGHT: 219.6 LBS | RESPIRATION RATE: 16 BRPM | BODY MASS INDEX: 35.29 KG/M2 | HEART RATE: 64 BPM | DIASTOLIC BLOOD PRESSURE: 83 MMHG | SYSTOLIC BLOOD PRESSURE: 123 MMHG | OXYGEN SATURATION: 98 % | TEMPERATURE: 97.6 F | HEIGHT: 66 IN

## 2022-12-16 DIAGNOSIS — E66.812 CLASS 2 OBESITY DUE TO EXCESS CALORIES WITHOUT SERIOUS COMORBIDITY WITH BODY MASS INDEX (BMI) OF 35.0 TO 35.9 IN ADULT: ICD-10-CM

## 2022-12-16 DIAGNOSIS — Z12.11 SCREEN FOR COLON CANCER: ICD-10-CM

## 2022-12-16 DIAGNOSIS — Z00.00 ROUTINE GENERAL MEDICAL EXAMINATION AT A HEALTH CARE FACILITY: Primary | ICD-10-CM

## 2022-12-16 DIAGNOSIS — E66.09 CLASS 2 OBESITY DUE TO EXCESS CALORIES WITHOUT SERIOUS COMORBIDITY WITH BODY MASS INDEX (BMI) OF 35.0 TO 35.9 IN ADULT: ICD-10-CM

## 2022-12-16 DIAGNOSIS — R13.19 ESOPHAGEAL DYSPHAGIA: ICD-10-CM

## 2022-12-16 DIAGNOSIS — R12 HEART BURN: ICD-10-CM

## 2022-12-16 DIAGNOSIS — R04.0 BLEEDING FROM THE NOSE: ICD-10-CM

## 2022-12-16 LAB
ALBUMIN SERPL-MCNC: 4 G/DL (ref 3.4–5)
ALP SERPL-CCNC: 62 U/L (ref 40–150)
ALT SERPL W P-5'-P-CCNC: 37 U/L (ref 0–70)
ANION GAP SERPL CALCULATED.3IONS-SCNC: 3 MMOL/L (ref 3–14)
AST SERPL W P-5'-P-CCNC: 14 U/L (ref 0–45)
BILIRUB SERPL-MCNC: 0.4 MG/DL (ref 0.2–1.3)
BUN SERPL-MCNC: 15 MG/DL (ref 7–30)
CALCIUM SERPL-MCNC: 8.8 MG/DL (ref 8.5–10.1)
CHLORIDE BLD-SCNC: 111 MMOL/L (ref 94–109)
CHOLEST SERPL-MCNC: 198 MG/DL
CO2 SERPL-SCNC: 29 MMOL/L (ref 20–32)
CREAT SERPL-MCNC: 0.92 MG/DL (ref 0.66–1.25)
ERYTHROCYTE [DISTWIDTH] IN BLOOD BY AUTOMATED COUNT: 13.3 % (ref 10–15)
FASTING STATUS PATIENT QL REPORTED: YES
GFR SERPL CREATININE-BSD FRML MDRD: >90 ML/MIN/1.73M2
GLUCOSE BLD-MCNC: 92 MG/DL (ref 70–99)
HCT VFR BLD AUTO: 43.4 % (ref 40–53)
HDLC SERPL-MCNC: 65 MG/DL
HGB BLD-MCNC: 14.3 G/DL (ref 13.3–17.7)
INR PPP: 1.14 (ref 0.85–1.15)
LDLC SERPL CALC-MCNC: 118 MG/DL
MCH RBC QN AUTO: 28.8 PG (ref 26.5–33)
MCHC RBC AUTO-ENTMCNC: 32.9 G/DL (ref 31.5–36.5)
MCV RBC AUTO: 87 FL (ref 78–100)
NONHDLC SERPL-MCNC: 133 MG/DL
PLATELET # BLD AUTO: 157 10E3/UL (ref 150–450)
POTASSIUM BLD-SCNC: 4.2 MMOL/L (ref 3.4–5.3)
PROT SERPL-MCNC: 7.1 G/DL (ref 6.8–8.8)
PSA SERPL-MCNC: 0.37 UG/L (ref 0–4)
RBC # BLD AUTO: 4.97 10E6/UL (ref 4.4–5.9)
SODIUM SERPL-SCNC: 143 MMOL/L (ref 133–144)
TRIGL SERPL-MCNC: 75 MG/DL
WBC # BLD AUTO: 4.1 10E3/UL (ref 4–11)

## 2022-12-16 PROCEDURE — 80053 COMPREHEN METABOLIC PANEL: CPT | Performed by: FAMILY MEDICINE

## 2022-12-16 PROCEDURE — 85610 PROTHROMBIN TIME: CPT | Performed by: FAMILY MEDICINE

## 2022-12-16 PROCEDURE — 99396 PREV VISIT EST AGE 40-64: CPT | Performed by: FAMILY MEDICINE

## 2022-12-16 PROCEDURE — 0124A COVID-19 VACCINE BIVALENT BOOSTER 12+ (PFIZER): CPT | Performed by: FAMILY MEDICINE

## 2022-12-16 PROCEDURE — 80061 LIPID PANEL: CPT | Performed by: FAMILY MEDICINE

## 2022-12-16 PROCEDURE — G0103 PSA SCREENING: HCPCS | Performed by: FAMILY MEDICINE

## 2022-12-16 PROCEDURE — 99214 OFFICE O/P EST MOD 30 MIN: CPT | Mod: 25 | Performed by: FAMILY MEDICINE

## 2022-12-16 PROCEDURE — 36415 COLL VENOUS BLD VENIPUNCTURE: CPT | Performed by: FAMILY MEDICINE

## 2022-12-16 PROCEDURE — 85027 COMPLETE CBC AUTOMATED: CPT | Performed by: FAMILY MEDICINE

## 2022-12-16 PROCEDURE — 91312 COVID-19 VACCINE BIVALENT BOOSTER 12+ (PFIZER): CPT | Performed by: FAMILY MEDICINE

## 2022-12-16 RX ORDER — FAMOTIDINE 40 MG/1
40 TABLET, FILM COATED ORAL DAILY
Qty: 30 TABLET | Refills: 1 | Status: SHIPPED | OUTPATIENT
Start: 2022-12-16

## 2022-12-16 ASSESSMENT — ENCOUNTER SYMPTOMS
FREQUENCY: 0
ARTHRALGIAS: 1
HEADACHES: 0
NERVOUS/ANXIOUS: 0
PALPITATIONS: 0
CHILLS: 0
NAUSEA: 0
HEARTBURN: 0
HEMATURIA: 0
WEAKNESS: 0
HEMATOCHEZIA: 0
SORE THROAT: 0
ABDOMINAL PAIN: 0
DYSURIA: 0
CONSTIPATION: 0
FEVER: 0
DIZZINESS: 0
EYE PAIN: 0
MYALGIAS: 0
COUGH: 0
JOINT SWELLING: 0
PARESTHESIAS: 0
SHORTNESS OF BREATH: 0
DIARRHEA: 0

## 2022-12-16 ASSESSMENT — PAIN SCALES - GENERAL: PAINLEVEL: NO PAIN (0)

## 2022-12-16 NOTE — PROGRESS NOTES
SUBJECTIVE:   CC: Duran is an 47 year old who presents for preventative health visit.     Patient has been advised of split billing requirements and indicates understanding: Yes  Healthy Habits:     Getting at least 3 servings of Calcium per day:  NO    Bi-annual eye exam:  Yes    Dental care twice a year:  NO    Sleep apnea or symptoms of sleep apnea:  Excessive snoring    Diet:  Regular (no restrictions)    Frequency of exercise:  4-5 days/week    Duration of exercise:  15-30 minutes    Taking medications regularly:  Yes    Medication side effects:  None    PHQ-2 Total Score: 0    Additional concerns today:  Yes    -Nose bleed intermittently alessandro in morning for over a year  -Food struck in chest when eating dry food        Today's PHQ-2 Score:   PHQ-2 ( 1999 [employer]) 12/16/2022   Q1: Little interest or pleasure in doing things -   Q2: Feeling down, depressed or hopeless -   PHQ-2 Score -   PHQ-2 Score Incomplete       Have you ever done Advance Care Planning? (For example, a Health Directive, POLST, or a discussion with a medical provider or your loved ones about your wishes): No, advance care planning information given to patient to review.  Patient declined advance care planning discussion at this time.    Social History     Tobacco Use     Smoking status: Never     Smokeless tobacco: Never   Substance Use Topics     Alcohol use: No     If you drink alcohol do you typically have >3 drinks per day or >7 drinks per week? No    Alcohol Use 6/15/2012   Prescreen: >3 drinks/day or >7 drinks/week? The patient does not drink >3 drinks per day nor >7 drinks per week.   No flowsheet data found.    Last PSA: No results found for: PSA    Reviewed orders with patient. Reviewed health maintenance and updated orders accordingly - Yes  Labs reviewed in EPIC    Reviewed and updated as needed this visit by clinical staff                  Reviewed and updated as needed this visit by Provider                     Review of Systems    Constitutional: Negative for chills and fever.   HENT: Positive for congestion. Negative for ear pain, hearing loss and sore throat.    Eyes: Negative for pain and visual disturbance.   Respiratory: Negative for cough and shortness of breath.    Cardiovascular: Negative for chest pain, palpitations and peripheral edema.   Gastrointestinal: Negative for abdominal pain, constipation, diarrhea, heartburn, hematochezia and nausea.   Genitourinary: Negative for dysuria, frequency, genital sores, hematuria, impotence, penile discharge and urgency.   Musculoskeletal: Positive for arthralgias. Negative for joint swelling and myalgias.   Skin: Negative for rash.   Neurological: Negative for dizziness, weakness, headaches and paresthesias.   Psychiatric/Behavioral: Negative for mood changes. The patient is not nervous/anxious.          OBJECTIVE:   There were no vitals taken for this visit.    Physical Exam  GENERAL: healthy, alert and no distress  NECK: no adenopathy, no asymmetry, masses, or scars and thyroid normal to palpation  RESP: lungs clear to auscultation - no rales, rhonchi or wheezes  CV: regular rate and rhythm, normal S1 S2, no S3 or S4, no murmur, click or rub, no peripheral edema and peripheral pulses strong  ABDOMEN: soft, nontender, no hepatosplenomegaly, no masses and bowel sounds normal  MS: no gross musculoskeletal defects noted, no edema    Diagnostic Test Results:  Labs reviewed in Epic    ASSESSMENT/PLAN:   (Z00.00) Routine general medical examination at a health care facility  (primary encounter diagnosis)  -Normal vitals.  -Received COVID booster today.  -Routine labs ordered.    Plan: CBC with platelets, Comprehensive metabolic         panel (BMP + Alb, Alk Phos, ALT, AST, Total.         Bili, TP), Lipid panel reflex to direct LDL         Fasting, PSA, screen         (R04.0) Bleeding from the nose  -Nose bleed noticed especially in the morning for the past 1 year.  -Enlarged turbinates on  examination..  -Referral sent to ENT.  Encouraged to get humidifiers at home.    Plan: INR, Adult ENT  Referral          (R13.19) Esophageal dysphagia  - feels sometimes he has difficulty swallowing food and food gets stuck in the chest and he feels tight in the chest.    -Also he has been noticing discomfort in his stomach, bloating sensation and feels food is staying in for a long time.  -He is avoiding meat and dry stuff like bread.  Chewing thoroughly is helping.    Plan: Adult GI  Referral - Consult Only         (R12) Heart burn  -History of heartburn especially when he eats late at night and goes to bed.    Plan: famotidine (PEPCID) 40 MG tablet      (Z12.11) Screen for colon cancer  -First-time screening.  No family history of colon cancers.  Plan: Colonoscopy Screening  Referral      (E66.09,  Z68.35) Class 2 obesity due to excess calories without serious comorbidity with body mass index (BMI) of 35.0 to 35.9 in adult  -He is working on his diet and physical activity.  Finds it difficult to lose weight.    Patient has been advised of split billing requirements and indicates understanding: Yes      COUNSELING:   Reviewed preventive health counseling, as reflected in patient instructions       Regular exercise       Healthy diet/nutrition        He reports that he has never smoked. He has never used smokeless tobacco.            Dottie Salazar MD  [facility]

## 2023-04-11 ENCOUNTER — TELEPHONE (OUTPATIENT)
Dept: GASTROENTEROLOGY | Facility: CLINIC | Age: 48
End: 2023-04-11
Payer: COMMERCIAL

## 2023-04-23 ENCOUNTER — HEALTH MAINTENANCE LETTER (OUTPATIENT)
Age: 48
End: 2023-04-23

## 2023-04-25 NOTE — TELEPHONE ENCOUNTER
Unable to lvmx2, sent mychart 2, sent letter. Informed pt that the appt will be cancelled. Left call center # to call back and reschedule. Schedule next available appt with Dr. Stroud, he has a lot of openings sooner than August FYI.

## 2023-12-05 ENCOUNTER — OFFICE VISIT (OUTPATIENT)
Dept: URGENT CARE | Facility: URGENT CARE | Age: 48
End: 2023-12-05
Payer: COMMERCIAL

## 2023-12-05 VITALS
BODY MASS INDEX: 35.48 KG/M2 | SYSTOLIC BLOOD PRESSURE: 151 MMHG | DIASTOLIC BLOOD PRESSURE: 95 MMHG | OXYGEN SATURATION: 97 % | HEART RATE: 62 BPM | WEIGHT: 219.8 LBS | TEMPERATURE: 98.4 F | RESPIRATION RATE: 18 BRPM

## 2023-12-05 DIAGNOSIS — J01.90 ACUTE SINUSITIS WITH SYMPTOMS > 10 DAYS: Primary | ICD-10-CM

## 2023-12-05 DIAGNOSIS — R09.81 CHRONIC NASAL CONGESTION: ICD-10-CM

## 2023-12-05 DIAGNOSIS — J30.2 SEASONAL ALLERGIC RHINITIS, UNSPECIFIED TRIGGER: ICD-10-CM

## 2023-12-05 PROCEDURE — 99214 OFFICE O/P EST MOD 30 MIN: CPT | Performed by: PHYSICIAN ASSISTANT

## 2023-12-05 RX ORDER — CETIRIZINE HYDROCHLORIDE 10 MG/1
10 TABLET ORAL DAILY
Qty: 90 TABLET | Refills: 0 | Status: SHIPPED | OUTPATIENT
Start: 2023-12-05 | End: 2024-03-04

## 2023-12-05 RX ORDER — DOXYCYCLINE HYCLATE 100 MG
100 TABLET ORAL 2 TIMES DAILY
Qty: 20 TABLET | Refills: 0 | Status: SHIPPED | OUTPATIENT
Start: 2023-12-05 | End: 2023-12-15

## 2023-12-05 RX ORDER — FLUTICASONE PROPIONATE 50 MCG
1 SPRAY, SUSPENSION (ML) NASAL DAILY
Qty: 16 G | Refills: 0 | Status: SHIPPED | OUTPATIENT
Start: 2023-12-05 | End: 2023-12-15

## 2023-12-05 ASSESSMENT — PAIN SCALES - GENERAL: PAINLEVEL: MILD PAIN (2)

## 2023-12-05 NOTE — PROGRESS NOTES
Chief Complaint   Patient presents with    URI     Headache, runny nose for over a month, wet cough started a couple days ago, no sore throat              ASSESSMENT:     ICD-10-CM    1. Acute sinusitis with symptoms > 10 days  J01.90 doxycycline hyclate (VIBRA-TABS) 100 MG tablet     cetirizine (ZYRTEC) 10 MG tablet     fluticasone (FLONASE) 50 MCG/ACT nasal spray      2. Chronic nasal congestion  R09.81 doxycycline hyclate (VIBRA-TABS) 100 MG tablet     cetirizine (ZYRTEC) 10 MG tablet     fluticasone (FLONASE) 50 MCG/ACT nasal spray      3. Seasonal allergic rhinitis, unspecified trigger  J30.2             PLAN: Acute sinusitis on top of possible allergic rhinitis.  Zyrtec prescribed.  Must take for at least 2 weeks to know if it makes a difference with the chronic nasal congestion.  Doxycycline antibiotic.  Flonase nasal spray.  I have discussed clinical findings with patient.  Side effects of medications discussed.  Symptomatic care is discussed.  I have discussed the possibility of  worsening symptoms and indication to RTC or go to the ER if they occur.  All questions are answered, patient indicates understanding of these issues and is in agreement with plan.   Patient care instructions are discussed/given at the end of visit.   Lots of rest and fluids.      Jaci Myers PA-C      SUBJECTIVE:  48-year-old male presents for evaluation of sinus headache for the past 3 to 4 days with chronic nasal congestion over the past month.  States over the last year he has noted on and off significant nasal congestion.  Occasional sneezing.  No watery or itchy eyes.  Is wondering if he may have some sort of allergy.  No current fever.  No rash.  No nausea, vomiting, diarrhea.      Allergies   Allergen Reactions    Chloroquine Itching     Experienced in [place]    Seasonal Allergies        Past Medical History:   Diagnosis Date    History of hepatitis B     Immune    Recurrent genital herpes        cetirizine (ZYRTEC)  10 MG tablet, Take 1 tablet (10 mg) by mouth daily as needed for allergies (Patient not taking: Reported on 5/31/2021)  famotidine (PEPCID) 40 MG tablet, Take 1 tablet (40 mg) by mouth daily (Patient not taking: Reported on 12/5/2023)  fluticasone (FLONASE) 50 MCG/ACT nasal spray, Spray 2 sprays into both nostrils daily for allergy prevention. (Patient not taking: Reported on 5/31/2021)  ibuprofen (ADVIL/MOTRIN) 600 MG tablet, Take 1 tablet (600 mg) by mouth every 6 hours as needed for moderate pain (Patient not taking: Reported on 12/16/2022)    No current facility-administered medications on file prior to visit.      Social History     Tobacco Use    Smoking status: Never    Smokeless tobacco: Never   Substance Use Topics    Alcohol use: No       ROS:  CONSTITUTIONAL: Negative for fatigue or fever.  EYES: Negative for eye problems.  ENT: As above.  RESP: As above.  CV: Negative for chest pains.  GI: Negative for vomiting.  MUSCULOSKELETAL:  Negative for significant muscle or joint pains.  NEUROLOGIC: Negative for headaches.  SKIN: Negative for rash.  PSYCH: Normal mentation for age.    OBJECTIVE:  BP (!) 151/95 (BP Location: Left arm, Patient Position: Sitting, Cuff Size: Adult Large)   Pulse 62   Temp 98.4  F (36.9  C) (Tympanic)   Resp 18   Wt 99.7 kg (219 lb 12.8 oz)   SpO2 97%   BMI 35.48 kg/m    GENERAL APPEARANCE: Healthy, alert and no distress.  EYES:Conjunctiva/sclera clear.  EARS: No cerumen.   Ear canals w/o erythema.  TM's intact w/o erythema.    SINUSES: Mild bilateral  maxillary sinus tenderness.  THROAT: No erythema w/o tonsillar enlargement .  Some postnasal drip noted.    NECK: Supple, nontender, no lymphadenopathy.  RESP: Lungs clear to auscultation - no rales, rhonchi or wheezes  CV: Regular rate and rhythm, normal S1 S2, no murmur noted.  NEURO: Awake, alert    SKIN: No rashes        Jaci Myers PA-C

## 2024-01-31 DIAGNOSIS — J30.89 PERENNIAL ALLERGIC RHINITIS: ICD-10-CM

## 2024-01-31 RX ORDER — FLUTICASONE PROPIONATE 50 MCG
2 SPRAY, SUSPENSION (ML) NASAL DAILY
Qty: 10 ML | Refills: 0 | Status: SHIPPED | OUTPATIENT
Start: 2024-01-31

## 2024-02-11 ENCOUNTER — HEALTH MAINTENANCE LETTER (OUTPATIENT)
Age: 49
End: 2024-02-11

## 2024-08-06 ENCOUNTER — OFFICE VISIT (OUTPATIENT)
Dept: URGENT CARE | Facility: URGENT CARE | Age: 49
End: 2024-08-06
Payer: OTHER MISCELLANEOUS

## 2024-08-06 ENCOUNTER — ANCILLARY PROCEDURE (OUTPATIENT)
Dept: GENERAL RADIOLOGY | Facility: CLINIC | Age: 49
End: 2024-08-06
Attending: PHYSICIAN ASSISTANT
Payer: COMMERCIAL

## 2024-08-06 VITALS
OXYGEN SATURATION: 98 % | DIASTOLIC BLOOD PRESSURE: 86 MMHG | RESPIRATION RATE: 18 BRPM | SYSTOLIC BLOOD PRESSURE: 119 MMHG | BODY MASS INDEX: 34.22 KG/M2 | HEART RATE: 55 BPM | WEIGHT: 212 LBS | TEMPERATURE: 98.5 F

## 2024-08-06 DIAGNOSIS — M54.50 ACUTE LEFT-SIDED LOW BACK PAIN WITHOUT SCIATICA: Primary | ICD-10-CM
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
## 2024-08-06 DIAGNOSIS — M54.50 ACUTE LEFT-SIDED LOW BACK PAIN WITHOUT SCIATICA: ICD-10-CM

## 2024-08-06 PROCEDURE — 99214 OFFICE O/P EST MOD 30 MIN: CPT | Performed by: PHYSICIAN ASSISTANT

## 2024-08-06 PROCEDURE — 72100 X-RAY EXAM L-S SPINE 2/3 VWS: CPT | Mod: TC | Performed by: RADIOLOGY

## 2024-08-06 RX ORDER — METHYLPREDNISOLONE 4 MG
TABLET, DOSE PACK ORAL
Qty: 21 TABLET | Refills: 0 | Status: SHIPPED | OUTPATIENT
Start: 2024-08-06

## 2024-08-06 RX ORDER — CYCLOBENZAPRINE HCL 10 MG
10 TABLET ORAL
Qty: 20 TABLET | Refills: 0 | Status: SHIPPED | OUTPATIENT
Start: 2024-08-06 | End: 2024-08-26

## 2024-08-06 ASSESSMENT — PAIN SCALES - GENERAL: PAINLEVEL: SEVERE PAIN (7)

## 2024-08-06 NOTE — PROGRESS NOTES
Chief Complaint   Patient presents with    Back Pain      7/24/24 - lower back pain that has now evolved to right side pain, when trying to sit and lift: 9/10 pain, when walking 7/10 pain         X-rays-I see no obvious stress fracture, normal alignment    Results for orders placed or performed in visit on 08/06/24   XR Lumbar Spine 2/3 Views     Status: None    Narrative    LUMBAR SPINE TWO TO THREE VIEWS  8/6/2024 11:04 AM     HISTORY: Acute left-sided low back pain.    COMPARISON: None.       Impression    IMPRESSION: Alignment of the lumbar spine is within normal limits. No  loss of vertebral body height. Mild degenerative endplate changes  without significant loss of disc height at L2-L3 and to a lesser  extent L1-L2 and L3-L4.    DEEPTI SWIFT MD         SYSTEM ID:  [id number]         Impression:     ICD-10-CM    1. Acute left-sided low back pain without sciatica  M54.50 XR Lumbar Spine 2/3 Views     cyclobenzaprine (FLEXERIL) 10 MG tablet     methylPREDNISolone (MEDROL DOSEPAK) 4 MG tablet therapy pack     Orthopedic  Referral      2. Work related injury  Y99.0 Orthopedic  Referral          Plan: Vital signs stable.  Neurologically intact.  No evidence of cauda equina syndrome.  Likely musculoskeletal etiology, work related.  On average can take 6 weeks to completely heal.  Medrol Dosepak for pain and inflammation.  Flexeril at bedtime for muscle spasm.  No lifting more than 20 pounds.  Follow-up with Ortho.  Instructions for back care and return precautions discussed.        Jaci Myers PA-C      SUBJECTIVE:    50 yo male presents with acute left low back pain, work injury for [employer] on 7/24/2024.  Was lifting a big more than 30 pounds and felt a sharp pain in his left low back.  No lower extremity radicular pain, numbness, tingling, weakness.  No bowel or bladder trouble.  Can tell when he has to go the bathroom and is able to get there on time.  No fevers chills, night  sweats.  Told his supervisor the following day.  Has tried ice and ibuprofen without relief.  Pain is 5 out of 10 when supine, 6-7 out of 10 with sitting and when he missteps he states it is 9 out of 10.  Shot        Allergies   Allergen Reactions    Chloroquine Itching     Experienced in [place]    Seasonal Allergies        Past Medical History:   Diagnosis Date    History of hepatitis B     Immune    Recurrent genital herpes        Current Outpatient Medications   Medication Sig Dispense Refill    cetirizine (ZYRTEC) 10 MG tablet Take 1 tablet (10 mg) by mouth daily as needed for allergies (Patient not taking: Reported on 5/31/2021) 365 tablet prn    famotidine (PEPCID) 40 MG tablet Take 1 tablet (40 mg) by mouth daily (Patient not taking: Reported on 12/5/2023) 30 tablet 1    fluticasone (FLONASE) 50 MCG/ACT nasal spray Spray 2 sprays into both nostrils daily for allergy prevention. 10 mL 0    ibuprofen (ADVIL/MOTRIN) 600 MG tablet Take 1 tablet (600 mg) by mouth every 6 hours as needed for moderate pain (Patient not taking: Reported on 12/16/2022) 30 tablet 0     No current facility-administered medications for this visit.       Past Surgical History:   Procedure Laterality Date    NO HISTORY OF SURGERY         Social History     Socioeconomic History    Marital status:      Spouse name: Grayson Colin    Number of children: 3    Years of education: Not on file    Highest education level: Not on file   Occupational History     Employer: [employer]   Tobacco Use    Smoking status: Never    Smokeless tobacco: Never   Vaping Use    Vaping status: Never Used   Substance and Sexual Activity    Alcohol use: No    Drug use: No    Sexual activity: Yes     Partners: Female   Other Topics Concern    Parent/sibling w/ CABG, MI or angioplasty before 65F 55M? No   Social History Narrative    From [place] [address]     Social Determinants of Health     Financial Resource Strain: Not on file   Food  Insecurity: Not on file   Transportation Needs: Not on file   Physical Activity: Not on file   Stress: Not on file   Social Connections: Not on file   Interpersonal Safety: Not on file   Housing Stability: Not on file       REVIEW OF SYSTEMS  General: negative for fever  Resp: negative for chest pain   CV: negative for chest pain  : negative for dysuria , incontinence, frequency  Musculoskeletal: as above  Neurologic: negative for ataxia, saddle anesthesia, fecal incontinence, one sided weakness,  paresthesias    Physical Exam:  /86 (BP Location: Left arm, Patient Position: Sitting, Cuff Size: Adult Regular)   Pulse 55   Temp 98.5  F (36.9  C) (Tympanic)   Resp 18   Wt 96.2 kg (212 lb)   SpO2 98%   BMI 34.22 kg/m      Constitutional: healthy, alert and no acute distress   CARDIO: RRR, no MRG  RESP: lungs CTA, NAD  NEURO: Patellar  and ankle reflexes intact and equal bilaterally  BACK:  Straight leg raise intact, left lumbar paraspinal muscle TTP, strength intact and equal bilateral lower extremities.  Pain with both flexion and extension, worse with extension.  Also with some left sacroiliac joint tenderness.  GAIT: intact  Psychiatric: mentation appears normal and affect normal/bright        Jaci Myers PA-C

## 2024-08-06 NOTE — LETTER
August 6, 2024      Duran Santiago  [address]  [facility] [address]        To Whom It May Concern:    Duran Santiago  was seen on 8/6/2024. No lifting more than 20 lbs until seen by ortho.        Sincerely,        Jaci Myers PA-C

## 2024-09-03 ENCOUNTER — OFFICE VISIT (OUTPATIENT)
Dept: PHYSICAL MEDICINE AND REHAB | Facility: CLINIC | Age: 49
End: 2024-09-03
Attending: PHYSICIAN ASSISTANT
Payer: OTHER MISCELLANEOUS

## 2024-09-03 VITALS
WEIGHT: 212 LBS | SYSTOLIC BLOOD PRESSURE: 119 MMHG | DIASTOLIC BLOOD PRESSURE: 73 MMHG | HEART RATE: 83 BPM | BODY MASS INDEX: 34.07 KG/M2 | HEIGHT: 66 IN

## 2024-09-03 DIAGNOSIS — M54.50 ACUTE LEFT-SIDED LOW BACK PAIN WITHOUT SCIATICA: ICD-10-CM
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
## 2024-09-03 PROCEDURE — 99204 OFFICE O/P NEW MOD 45 MIN: CPT | Performed by: NURSE PRACTITIONER

## 2024-09-03 ASSESSMENT — PAIN SCALES - GENERAL: PAINLEVEL: MILD PAIN (2)

## 2024-09-03 NOTE — PATIENT INSTRUCTIONS
Likely muscle strain    OK to return to work without restrictions  We talked about as-needed ibuprofen, PT referral if the pain returns.  For now, he was given strengthening exercises which he can do at home  OK to continue your lumbar support belt as needed, but no more than a few hours each day so that muscle atrophy does not occur    ~Please call our [facility] Nurse Navigation line [phone] with any questions or concerns about your treatment plan, if symptoms worsen and you would like to be seen urgently, or if you have any new or worsening numbness, weakness, or problems controlling bladder and bowel function.  ~You are also welcome to contact Ying Carbajal via [employer], but please be aware that responses to [employer] message may take 2-3 days due to the high volume of patients seen in clinic.

## 2024-09-03 NOTE — LETTER
September 3, 2024      Duran CAMILA Santiago  [id number] [address] [address]        To Whom It May Concern:    Duran Santiago was seen in our clinic. He may return to work without restrictions on 9/4/2024.      Sincerely,        DYLLAN Dang CNP

## 2024-09-03 NOTE — LETTER
9/3/2024      Duran Santiago  [address]  [facility] [address]      Dear Colleague,    Thank you for referring your patient, Duran Santiago, to the [facility]. Please see a copy of my visit note below.    ASSESSMENT: Duran Santiago is a 49 year old male who presents for consultation at the request of PCP Brock Still, who presents today for new patient evaluation of:    -acute left sided low back pain without sciatica      Patient is neurologically intact on exam. No myelopathic or red flag symptoms. Mild left sided lower lumbar paraspinal musculature tenderness. Likely muscular strain. We reviewed his lumbar XR which show some mild lower lumbar facet arthropathy and some mild degenerative endplate changes. No fracture and would be very unlikely if he sustained a fracture that his pain would already be so much improved. Encouraged ongoing use of as needed otc ibuprofen. Ok to return to work without restrictions. Discussed role of PT. Would recommend formal referral if symptoms returned. Otherwise did provide patient with exercises to perform daily basis for maintenance. He will continue to use his lumbar support belt as needed at work for lifting, but no more than that to prevent muscle atrophy. Return to work note provided. He will follow up PRN     As his pain has improved, he reports improved sexual dysfunction and pain w urination. I did encourage him to see PCP if these particular symptoms return as could be alternate causes which would require workup          9/3/2024     7:43 AM   OSWESTRY DISABILITY INDEX   Count 10   Sum 2   Oswestry Score (%) 4 %            Diagnoses and all orders for this visit:  Acute left-sided low back pain without sciatica  -     Orthopedic  Referral  Work related injury  -     Orthopedic  Referral      PLAN:  Reviewed spine anatomy and disease process. Discussed diagnosis and treatment options with the patient today. A shared  decision making model was used.  The patient's values and choices were respected. The following represents what was discussed and decided upon by the provider and the patient.      -DIAGNOSTIC TESTS:  Images were personally reviewed and interpreted and explained to patient today using a spine model.   -no imaging ordered    -PHYSICAL THERAPY:    --given home PT exercises  -would add a formal referral to PT if symptoms returned        -MEDICATIONS:    --otc ibuprofen prn  -ok to take flexeril as directed as needed as well    Discussed multiple medication options today with patient. Discussed risks, side effects, and proper use of medications. Patient verbalized understanding.    -INTERVENTIONS:    -Did not discuss injections. Patient would be a good candidate in the future for either epidural steroid injections or medial branch blocks if indicated based on symptoms and supported by imaging results.    -PATIENT EDUCATION:  Total time of 40 minutes, on the day of service, spent with the patient, reviewing the chart, placing orders, and documenting.   -Today we also discussed the pros and cons of the current treatment plan.    -FOLLOW-UP: PRN    Advised patient to call the Sp[facility] Center if symptoms worsen, new numbness or weakness develops in the legs, or if they develop new or worsening problems controlling bladder or bowel function.   ______________________________________________________________________    SUBJECTIVE:    HPI:  Duran Santiago  Is a 49 year old male hx genital herpes, hepatitis B, obesity who presents today for new patient evaluation of acute left sided low back pain without sciatica    WC injury 7/24/24. He works at the [employer] for [employer]. He was lifting a bag and felt a sudden sharp pain in his left lower back. He kept working, thought it would just go away. Then 2 wks later he continued to feel consistent pain there and decided to get seen. Tried ibuprofen and ice initially without relief. Was  seen in urgent care on 8/6, He did have some lumbar XR done. He was treated with flexeril 10mg at bedtime and a medrol dose pack, referred to spine. He took these medications as directed. Pain has overall improved. Pain level 2/10 today with lifting 20 lbs. Feels dull. Going away. Pain worse with lifting. He is not taking any medications at this point.     No leg pain, numbness, weakness, weight loss, night sweats or nocturnal pain.  Does endorse some recent fatigue.    He has not done PT  No chiropractic care  No injections or previous spine surgeries        -Treatment to Date:     -Medications:  Ibuprofen  Flexeril  Medrol dose pack    Current Outpatient Medications   Medication Sig Dispense Refill     cetirizine (ZYRTEC) 10 MG tablet Take 1 tablet (10 mg) by mouth daily as needed for allergies (Patient not taking: Reported on 5/31/2021) 365 tablet prn     famotidine (PEPCID) 40 MG tablet Take 1 tablet (40 mg) by mouth daily (Patient not taking: Reported on 12/5/2023) 30 tablet 1     fluticasone (FLONASE) 50 MCG/ACT nasal spray Spray 2 sprays into both nostrils daily for allergy prevention. (Patient not taking: Reported on 8/6/2024) 10 mL 0     ibuprofen (ADVIL/MOTRIN) 600 MG tablet Take 1 tablet (600 mg) by mouth every 6 hours as needed for moderate pain (Patient not taking: Reported on 12/16/2022) 30 tablet 0     methylPREDNISolone (MEDROL DOSEPAK) 4 MG tablet therapy pack Follow Package Directions 21 tablet 0     No current facility-administered medications for this visit.       Allergies   Allergen Reactions     Chloroquine Itching     Experienced in [place]     Seasonal Allergies        Past Medical History:   Diagnosis Date     History of hepatitis B     Immune     Recurrent genital herpes         Patient Active Problem List   Diagnosis     CARDIOVASCULAR SCREENING; LDL GOAL LESS THAN 160     Perennial allergic rhinitis     Obesity, Class I, BMI 30-34.9       Past Surgical History:   Procedure Laterality Date  "    NO HISTORY OF SURGERY         Family History   Problem Relation Age of Onset     Asthma No family hx of      C.A.D. No family hx of      Diabetes No family hx of      Hypertension No family hx of      Cerebrovascular Disease No family hx of      Cancer No family hx of        Reviewed past medical, surgical, and family history with patient found on new patient intake packet located in EMR Media tab.     SOCIAL HX: nonsmoker, no alcohol use, no heavy drinking, no rec drug use    ROS: positive for sexual dysfunction, change in vision, painful urination, muscle fatigue, excessive tiredness. Specifically negative for bowel/bladder dysfunction, balance changes, headache, dizziness, foot drop, fevers, chills, appetite changes, nausea/vomiting, unexplained weight loss. Otherwise 13 systems reviewed are negative. Please see the patient's intake questionnaire from today for details.    OBJECTIVE:  /73   Pulse 83   Ht 5' 6\" (1.676 m)   Wt 212 lb (96.2 kg)   BMI 34.22 kg/m      PHYSICAL EXAMINATION:    --CONSTITUTIONAL:  Vital signs as above.  No acute distress.  The patient is well nourished and well groomed.  --PSYCHIATRIC:  Appropriate mood and affect. The patient is awake, alert, oriented to person, place, time and answering questions appropriately with clear speech.    --SKIN:  Skin over the face, bilateral lower extremities, and posterior torso is clean, dry, intact without rashes.    --RESPIRATORY: Normal rhythm and effort. No abnormal accessory muscle breathing patterns noted.   --ABDOMINAL:  Non-distended.    --GROSS MOTOR: Gait is non-antalgic. Easily arises from a seated position. Toe walking and heel walking are normal without significant difficulty.      --LOWER EXTREMITY MOTOR TESTING:  Hip flexion: right 5/5, left 5/5  Quads: right 5/5, left 5/5  Hamstrings: right 5/5, left 5/5  Dorsiflexion: right 5/5, left 5/5  Plantar flexion: right 5/5, left 5/5    Great toe MTP extension/EHL: right 5/5, left " 5/5    --NEUROLOGICAL:  1/4 patellar and achilles reflexes bilaterally. Sensation to light touch is intact throughout both lower extremities. Babinski is negative. No clonus.    --STANDING EXAMINATION:  Normal lumbar lordosis noted, no lateral shift.    --MUSCULOSKELETAL: Lumbar spine inspection reveals no evidence of deformity. Range of motion is not limited in lumbar flexion, extension, lateral rotation. Mild soreness with rotation naa. Mild soreness with sidebending naa. No point tenderness to palpation lumbar spine. Mild left lower lumbar paraspinal musculature tenderness.     Straight leg raising is negative.    --SACROILIAC JOINT:  One finger point test was negative. Negative SI joint tenderness to palpation bilaterally.    --VASCULAR:  Bilateral lower extremities are warm without any discoloration.  There is no pitting edema of the bilateral lower extremities.    RESULTS:   Prior medical records from [facility] and Care Everywhere were reviewed today.    Imaging: Spine imaging was personally reviewed and interpreted today. The images were shown to the patient and the findings were explained using a spine model.      XR Lumbar Spine 2/3 Views    Result Date: 8/6/2024  LUMBAR SPINE TWO TO THREE VIEWS  8/6/2024 11:04 AM HISTORY: Acute left-sided low back pain. COMPARISON: None.     IMPRESSION: Alignment of the lumbar spine is within normal limits. No loss of vertebral body height. Mild degenerative endplate changes without significant loss of disc height at L2-L3 and to a lesser extent L1-L2 and L3-L4. DEEPTI SWIFT MD   SYSTEM ID:  [id number]      LABS:  12/16/22   CMP  GFR >90  Creatinine 0.92      This note was dictated using voice recognition software. Any grammatical or context distortions are unintentional and inherent to the software.       Ying Carbajal FNP-C  [facility]  O. [phone]             Again, thank you for allowing me to participate in the care of your patient.         Sincerely,        DYLLAN Dang CNP

## 2024-09-03 NOTE — PROGRESS NOTES
ASSESSMENT: Duran Santiago is a 49 year old male who presents for consultation at the request of PCP Brock Still, who presents today for new patient evaluation of:    -acute left sided low back pain without sciatica      Patient is neurologically intact on exam. No myelopathic or red flag symptoms. Mild left sided lower lumbar paraspinal musculature tenderness. Likely muscular strain. We reviewed his lumbar XR which show some mild lower lumbar facet arthropathy and some mild degenerative endplate changes. No fracture and would be very unlikely if he sustained a fracture that his pain would already be so much improved. Encouraged ongoing use of as needed otc ibuprofen. Ok to return to work without restrictions. Discussed role of PT. Would recommend formal referral if symptoms returned. Otherwise did provide patient with exercises to perform daily basis for maintenance. He will continue to use his lumbar support belt as needed at work for lifting, but no more than that to prevent muscle atrophy. Return to work note provided. He will follow up PRN     As his pain has improved, he reports improved sexual dysfunction and pain w urination. I did encourage him to see PCP if these particular symptoms return as could be alternate causes which would require workup          9/3/2024     7:43 AM   OSWESTRY DISABILITY INDEX   Count 10   Sum 2   Oswestry Score (%) 4 %            Diagnoses and all orders for this visit:  Acute left-sided low back pain without sciatica  -     Orthopedic  Referral  Work related injury  -     Orthopedic  Referral      PLAN:  Reviewed spine anatomy and disease process. Discussed diagnosis and treatment options with the patient today. A shared decision making model was used.  The patient's values and choices were respected. The following represents what was discussed and decided upon by the provider and the patient.      -DIAGNOSTIC TESTS:  Images were personally reviewed and  interpreted and explained to patient today using a spine model.   -no imaging ordered    -PHYSICAL THERAPY:    --given home PT exercises  -would add a formal referral to PT if symptoms returned        -MEDICATIONS:    --otc ibuprofen prn  -ok to take flexeril as directed as needed as well    Discussed multiple medication options today with patient. Discussed risks, side effects, and proper use of medications. Patient verbalized understanding.    -INTERVENTIONS:    -Did not discuss injections. Patient would be a good candidate in the future for either epidural steroid injections or medial branch blocks if indicated based on symptoms and supported by imaging results.    -PATIENT EDUCATION:  Total time of 40 minutes, on the day of service, spent with the patient, reviewing the chart, placing orders, and documenting.   -Today we also discussed the pros and cons of the current treatment plan.    -FOLLOW-UP: PRN    Advised patient to call the Spine Center if symptoms worsen, new numbness or weakness develops in the legs, or if they develop new or worsening problems controlling bladder or bowel function.   ______________________________________________________________________    SUBJECTIVE:    HPI:  Duran Santiago  Is a 49 year old male hx genital herpes, hepatitis B, obesity who presents today for new patient evaluation of acute left sided low back pain without sciatica    WC injury 7/24/24. [place] [employer]. He was lifting a bag and felt a sudden sharp pain in his left lower back. He kept working, thought it would just go away. Then 2 wks later he continued to feel consistent pain there and decided to get seen. Tried ibuprofen and ice initially without relief. Was seen in urgent care on 8/6, He did have some lumbar XR done. He was treated with flexeril 10mg at bedtime and a medrol dose pack, referred to spine. He took these medications as directed. Pain has overall improved. Pain level 2/10 today  with lifting 20 lbs. Feels dull. Going away. Pain worse with lifting. He is not taking any medications at this point.     No leg pain, numbness, weakness, weight loss, night sweats or nocturnal pain.  Does endorse some recent fatigue.    He has not done PT  No chiropractic care  No injections or previous spine surgeries        -Treatment to Date:     -Medications:  Ibuprofen  Flexeril  Medrol dose pack    Current Outpatient Medications   Medication Sig Dispense Refill    cetirizine (ZYRTEC) 10 MG tablet Take 1 tablet (10 mg) by mouth daily as needed for allergies (Patient not taking: Reported on 5/31/2021) 365 tablet prn    famotidine (PEPCID) 40 MG tablet Take 1 tablet (40 mg) by mouth daily (Patient not taking: Reported on 12/5/2023) 30 tablet 1    fluticasone (FLONASE) 50 MCG/ACT nasal spray Spray 2 sprays into both nostrils daily for allergy prevention. (Patient not taking: Reported on 8/6/2024) 10 mL 0    ibuprofen (ADVIL/MOTRIN) 600 MG tablet Take 1 tablet (600 mg) by mouth every 6 hours as needed for moderate pain (Patient not taking: Reported on 12/16/2022) 30 tablet 0    methylPREDNISolone (MEDROL DOSEPAK) 4 MG tablet therapy pack Follow Package Directions 21 tablet 0     No current facility-administered medications for this visit.       Allergies   Allergen Reactions    Chloroquine Itching     Experienced in [place]    Seasonal Allergies        Past Medical History:   Diagnosis Date    History of hepatitis B     Immune    Recurrent genital herpes         Patient Active Problem List   Diagnosis    CARDIOVASCULAR SCREENING; LDL GOAL LESS THAN 160    Perennial allergic rhinitis    Obesity, Class I, BMI 30-34.9       Past Surgical History:   Procedure Laterality Date    NO HISTORY OF SURGERY         Family History   Problem Relation Age of Onset    Asthma No family hx of     C.A.D. No family hx of     Diabetes No family hx of     Hypertension No family hx of     Cerebrovascular Disease No family hx of      "Cancer No family hx of        Reviewed past medical, surgical, and family history with patient found on new patient intake packet located in EMR Media tab.     SOCIAL HX: nonsmoker, no alcohol use, no heavy drinking, no rec drug use    ROS: positive for sexual dysfunction, change in vision, painful urination, muscle fatigue, excessive tiredness. Specifically negative for bowel/bladder dysfunction, balance changes, headache, dizziness, foot drop, fevers, chills, appetite changes, nausea/vomiting, unexplained weight loss. Otherwise 13 systems reviewed are negative. Please see the patient's intake questionnaire from today for details.    OBJECTIVE:  /73   Pulse 83   Ht 5' 6\" (1.676 m)   Wt 212 lb (96.2 kg)   BMI 34.22 kg/m      PHYSICAL EXAMINATION:    --CONSTITUTIONAL:  Vital signs as above.  No acute distress.  The patient is well nourished and well groomed.  --PSYCHIATRIC:  Appropriate mood and affect. The patient is awake, alert, oriented to person, place, time and answering questions appropriately with clear speech.    --SKIN:  Skin over the face, bilateral lower extremities, and posterior torso is clean, dry, intact without rashes.    --RESPIRATORY: Normal rhythm and effort. No abnormal accessory muscle breathing patterns noted.   --ABDOMINAL:  Non-distended.    --GROSS MOTOR: Gait is non-antalgic. Easily arises from a seated position. Toe walking and heel walking are normal without significant difficulty.      --LOWER EXTREMITY MOTOR TESTING:  Hip flexion: right 5/5, left 5/5  Quads: right 5/5, left 5/5  Hamstrings: right 5/5, left 5/5  Dorsiflexion: right 5/5, left 5/5  Plantar flexion: right 5/5, left 5/5    Great toe MTP extension/EHL: right 5/5, left 5/5    --NEUROLOGICAL:  1/4 patellar and achilles reflexes bilaterally. Sensation to light touch is intact throughout both lower extremities. Babinski is negative. No clonus.    --STANDING EXAMINATION:  Normal lumbar lordosis noted, no lateral " shift.    --MUSCULOSKELETAL: Lumbar spine inspection reveals no evidence of deformity. Range of motion is not limited in lumbar flexion, extension, lateral rotation. Mild soreness with rotation naa. Mild soreness with sidebending naa. No point tenderness to palpation lumbar spine. Mild left lower lumbar paraspinal musculature tenderness.     Straight leg raising is negative.    --SACROILIAC JOINT:  One finger point test was negative. Negative SI joint tenderness to palpation bilaterally.    --VASCULAR:  Bilateral lower extremities are warm without any discoloration.  There is no pitting edema of the bilateral lower extremities.    RESULTS:   Prior medical records from [facility] and Care Everywhere were reviewed today.    Imaging: Spine imaging was personally reviewed and interpreted today. The images were shown to the patient and the findings were explained using a spine model.      XR Lumbar Spine 2/3 Views    Result Date: 8/6/2024  LUMBAR SPINE TWO TO THREE VIEWS  8/6/2024 11:04 AM HISTORY: Acute left-sided low back pain. COMPARISON: None.     IMPRESSION: Alignment of the lumbar spine is within normal limits. No loss of vertebral body height. Mild degenerative endplate changes without significant loss of disc height at L2-L3 and to a lesser extent L1-L2 and L3-L4. DEEPTI SWIFT MD   SYSTEM ID:  [id number]      LABS:  12/16/22   CMP  GFR >90  Creatinine 0.92      This note was dictated using voice recognition software. Any grammatical or context distortions are unintentional and inherent to the software.       Ying Carbajal FNP-C  [facility]  O. [phone]

## 2025-03-08 ENCOUNTER — HEALTH MAINTENANCE LETTER (OUTPATIENT)
Age: 50
End: 2025-03-08

## 2025-05-07 ENCOUNTER — OFFICE VISIT (OUTPATIENT)
Dept: URGENT CARE | Facility: URGENT CARE | Age: 50
End: 2025-05-07

## 2025-05-07 VITALS
HEIGHT: 66 IN | DIASTOLIC BLOOD PRESSURE: 85 MMHG | RESPIRATION RATE: 16 BRPM | HEART RATE: 88 BPM | OXYGEN SATURATION: 96 % | BODY MASS INDEX: 36.71 KG/M2 | TEMPERATURE: 97.9 F | WEIGHT: 228.4 LBS | SYSTOLIC BLOOD PRESSURE: 137 MMHG

## 2025-05-07 DIAGNOSIS — R13.10 FOOD STICKS ON SWALLOWING: Primary | ICD-10-CM

## 2025-05-07 DIAGNOSIS — R82.90 ABNORMAL URINE ODOR: ICD-10-CM

## 2025-05-07 DIAGNOSIS — R53.83 OTHER FATIGUE: ICD-10-CM

## 2025-05-07 DIAGNOSIS — J30.1 SEASONAL ALLERGIC RHINITIS DUE TO POLLEN: ICD-10-CM

## 2025-05-07 DIAGNOSIS — M21.42 FLAT FEET, BILATERAL: ICD-10-CM

## 2025-05-07 DIAGNOSIS — M21.41 FLAT FEET, BILATERAL: ICD-10-CM

## 2025-05-07 DIAGNOSIS — K21.00 GASTROESOPHAGEAL REFLUX DISEASE WITH ESOPHAGITIS WITHOUT HEMORRHAGE: ICD-10-CM

## 2025-05-07 DIAGNOSIS — M79.671 RIGHT FOOT PAIN: ICD-10-CM

## 2025-05-07 LAB
ALBUMIN UR-MCNC: NEGATIVE MG/DL
APPEARANCE UR: CLEAR
BILIRUB UR QL STRIP: NEGATIVE
COLOR UR AUTO: YELLOW
GLUCOSE UR STRIP-MCNC: NEGATIVE MG/DL
HGB UR QL STRIP: NEGATIVE
KETONES UR STRIP-MCNC: NEGATIVE MG/DL
LEUKOCYTE ESTERASE UR QL STRIP: NEGATIVE
NITRATE UR QL: NEGATIVE
PH UR STRIP: 5.5 [PH] (ref 5–7)
SP GR UR STRIP: 1.02 (ref 1–1.03)
UROBILINOGEN UR STRIP-ACNC: 0.2 E.U./DL

## 2025-05-07 PROCEDURE — 99215 OFFICE O/P EST HI 40 MIN: CPT | Performed by: INTERNAL MEDICINE

## 2025-05-07 PROCEDURE — 81003 URINALYSIS AUTO W/O SCOPE: CPT | Performed by: INTERNAL MEDICINE

## 2025-05-07 RX ORDER — OLOPATADINE HYDROCHLORIDE 2 MG/ML
1 SOLUTION/ DROPS OPHTHALMIC DAILY
Qty: 2.5 ML | Refills: 0 | Status: SHIPPED | OUTPATIENT
Start: 2025-05-07

## 2025-05-07 RX ORDER — FLUTICASONE PROPIONATE 50 MCG
1 SPRAY, SUSPENSION (ML) NASAL DAILY
Qty: 16 G | Refills: 0 | Status: SHIPPED | OUTPATIENT
Start: 2025-05-07

## 2025-05-07 RX ORDER — OMEPRAZOLE 40 MG/1
40 CAPSULE, DELAYED RELEASE ORAL DAILY
Qty: 30 CAPSULE | Refills: 0 | Status: SHIPPED | OUTPATIENT
Start: 2025-05-07

## 2025-05-07 ASSESSMENT — ENCOUNTER SYMPTOMS: DYSURIA: 0

## 2025-05-07 NOTE — PROGRESS NOTES
"Urgent Care Clinic Visit    Chief Complaint   Patient presents with    Foot Pain     Right foot pain x2-3 months now     Chest Pain     Chest pain on and off at night when sleeping, feel something set in chest even drink water and go down it hurts, don't know if gas, came here before and gave \"gas tablets\"    Fatigue     Tired and sleepy    Urinary Problem     Odor and urine smell stronger than normal, been 6 months               5/7/2025     3:10 PM   Additional Questions   Roomed by stephie jacobs   Accompanied by self             "

## 2025-05-07 NOTE — PROGRESS NOTES
ASSESSMENT AND PLAN:      ICD-10-CM    1. Food sticks on swallowing  R13.10 omeprazole (PRILOSEC) 40 MG DR capsule      2. Right foot pain  M79.671       3. Other fatigue  R53.83       4. Abnormal urine odor  R82.90 UA Macroscopic with reflex to Microscopic and Culture - Lab Collect     UA Macroscopic with reflex to Microscopic and Culture - Lab Collect      5. Seasonal allergic rhinitis due to pollen  J30.1 fluticasone (FLONASE) 50 MCG/ACT nasal spray     olopatadine (PATADAY) 0.2 % ophthalmic solution      6. Flat feet, bilateral  M21.41 Orthopedic  Referral    M21.42       7. Gastroesophageal reflux disease with esophagitis without hemorrhage  K21.00         Patient Instructions       Allergy eye drops  Flonase  Continue zyrtec    Dr Kaplan shoe insert  Referral to Podiatry  - bring shoes to appointment -including work boots/steel toe boots  May benefit from inserts made for feet  On feet all day.  Tennis shoes.    Start stomach medication  Omeprazole - refills with primary  Avoid coffee.  If stomach symptoms persist your primary may refer you to gastroenterology.  Of note you are due for colonoscopy as you state you have not had one        Drink water as you are having urinary odor and probably you are slightly dehydrated at times    Urine test -completely normal.  There is no sugar in the urine.  You do not have diabetes.    Recheck 1 month with primary    CC chart to primary          Return in about 1 month (around 6/7/2025).    40 minutes spent by me on the date of the encounter doing chart review, history and exam, documentation and further activities per the note    Margot Worrell MD  [facility]    Subjective     Duran JENSEN Jack is a 50 year old who presents for Patient presents with:  Foot Pain: Right foot pain x2-3 months now   Chest Pain: Chest pain on and off at night when sleeping, feel something set in chest even drink water and go down it hurts, don't know if gas,  "came here before and gave \"gas tablets\"  Fatigue: Tired and sleepy  Urinary Problem: Odor and urine smell stronger than normal, been 6 months    an established patient of [facility].      Today with few concerns  1.  Seasonal allergies   2.  foot pain   3.  Complains of symptoms of food getting stuck in chest.  Affecting sleep    .  Chart reviewed-Dx GERD in 2021      Seasonal allergy concerns  Onset of symptoms was 2.5 month(s) ago.  Current and Associated symptoms:   stuffy nose  Itchy eyes, ears, throat  and mouth breathing  fatigue  Treatment measures tried include Antihistamine. zyrtec  Predisposing factors include works outside.  Works at [employer]    Showers after work    MS Injury/Pain    Onset of symptoms was few month(s) ago.  Location: right foot  Context: no injury  Points to area of arch    Current and Associated symptoms: Pain  Aggravating Factors: weight-bearing  Therapies to improve symptoms include: none  This is the first time this type of problem has occurred for this patient.     Stands on feet all day.  Works at [employer].  Wears steel toed shoes    Intermittent chest pain       Occurs with sleeping, sharp pain  Feels like food stuck in chest -points to midsternal area  Tries water to relieve symptoms..  Cardiac risk factors:   denies known cardiac disease, prior MI, tobacco, diabetes mellitus, and positive family history.  Seen for 5/21 for GERD chest pain symptoms /esophagus symptoms   treatment omeprazole  Finished medication given to him at the visit      Just started drinking 1 coffee  No ETOH  He is a non-smoker.     Fatigue early shift    Review of Systems   Genitourinary:  Negative for dysuria.           Patient Active Problem List   Diagnosis    CARDIOVASCULAR SCREENING; LDL GOAL LESS THAN 160    Perennial allergic rhinitis    Obesity, Class I, BMI 30-34.9     Family History   Problem Relation Age of Onset    Asthma No family hx of     C.A.D. No family hx of     Diabetes No family hx of     " "Hypertension No family hx of     Cerebrovascular Disease No family hx of     Cancer No family hx of      Current Outpatient Medications   Medication Sig Dispense Refill    fluticasone (FLONASE) 50 MCG/ACT nasal spray Spray 1 spray into both nostrils daily. 16 g 0    olopatadine (PATADAY) 0.2 % ophthalmic solution Place 0.05 mLs (1 drop) into both eyes daily. 2.5 mL 0    omeprazole (PRILOSEC) 40 MG DR capsule Take 1 capsule (40 mg) by mouth daily. 30 capsule 0    cetirizine (ZYRTEC) 10 MG tablet Take 1 tablet (10 mg) by mouth daily as needed for allergies (Patient not taking: Reported on 5/7/2025) 365 tablet prn    famotidine (PEPCID) 40 MG tablet Take 1 tablet (40 mg) by mouth daily (Patient not taking: Reported on 5/7/2025) 30 tablet 1    fluticasone (FLONASE) 50 MCG/ACT nasal spray Spray 2 sprays into both nostrils daily for allergy prevention. (Patient not taking: Reported on 5/7/2025) 10 mL 0    ibuprofen (ADVIL/MOTRIN) 600 MG tablet Take 1 tablet (600 mg) by mouth every 6 hours as needed for moderate pain (Patient not taking: Reported on 5/7/2025) 30 tablet 0    methylPREDNISolone (MEDROL DOSEPAK) 4 MG tablet therapy pack Follow Package Directions (Patient not taking: Reported on 5/7/2025) 21 tablet 0             Objective    /85 (BP Location: Left arm, Patient Position: Sitting, Cuff Size: Adult Large)   Pulse 88   Temp 97.9  F (36.6  C) (Tympanic)   Resp 16   Ht 1.676 m (5' 6\")   Wt 103.6 kg (228 lb 6.4 oz)   SpO2 96%   BMI 36.86 kg/m    Physical Exam  Vitals reviewed.   Constitutional:       Appearance: Normal appearance.      Comments: Appears fatigued   HENT:      Right Ear: Tympanic membrane normal.      Left Ear: Tympanic membrane normal.      Nose: Congestion present.      Mouth/Throat:      Mouth: Mucous membranes are moist.      Pharynx: Oropharynx is clear.   Eyes:      Comments: Sclera and conjunctive injected   Cardiovascular:      Rate and Rhythm: Normal rate and regular rhythm.      " Pulses: Normal pulses.      Heart sounds: Normal heart sounds.   Pulmonary:      Effort: Pulmonary effort is normal.      Breath sounds: Normal breath sounds.   Abdominal:      General: Bowel sounds are normal.      Palpations: Abdomen is soft.      Tenderness: There is abdominal tenderness (Mild epigastric). There is no guarding or rebound.   Musculoskeletal:      Comments: Examination of feet reveals flat feet bilaterally  Pain along full arch of right foot    Otherwise benign exam    Wears tennis shoes   Neurological:      Mental Status: He is alert.            Results for orders placed or performed in visit on 05/07/25 (from the past 24 hours)   UA Macroscopic with reflex to Microscopic and Culture - Lab Collect    Specimen: Urine, Midstream   Result Value Ref Range    Color Urine Yellow Colorless, Straw, Light Yellow, Yellow    Appearance Urine Clear Clear    Glucose Urine Negative Negative mg/dL    Bilirubin Urine Negative Negative    Ketones Urine Negative Negative mg/dL    Specific Gravity Urine 1.025 1.003 - 1.035    Blood Urine Negative Negative    pH Urine 5.5 5.0 - 7.0    Protein Albumin Urine Negative Negative mg/dL    Urobilinogen Urine 0.2 0.2, 1.0 E.U./dL    Nitrite Urine Negative Negative    Leukocyte Esterase Urine Negative Negative    Narrative    Microscopic not indicated

## 2025-05-07 NOTE — PATIENT INSTRUCTIONS
Allergy eye drops  Flonase  Continue zyrtec    Dr Kaplan shoe insert  Referral to Podiatry  - bring shoes to appointment -including work boots/steel toe boots  May benefit from inserts made for feet  On feet all day.  Tennis shoes.    Start stomach medication  Omeprazole - refills with primary  Avoid coffee.  If stomach symptoms persist your primary may refer you to gastroenterology.  Of note you are due for colonoscopy as you state you have not had one        Drink water as you are having urinary odor and probably you are slightly dehydrated at times    Urine test -completely normal.  There is no sugar in the urine.  You do not have diabetes.    Recheck 1 month with primary    CC chart to primary

## 2025-05-29 ENCOUNTER — OFFICE VISIT (OUTPATIENT)
Dept: OPTOMETRY | Facility: CLINIC | Age: 50
End: 2025-05-29
Payer: COMMERCIAL

## 2025-05-29 DIAGNOSIS — H52.13 MYOPIA, BILATERAL: Primary | ICD-10-CM

## 2025-05-29 DIAGNOSIS — H52.4 PRESBYOPIA: ICD-10-CM

## 2025-05-29 DIAGNOSIS — H52.223 REGULAR ASTIGMATISM OF BOTH EYES: ICD-10-CM

## 2025-05-29 ASSESSMENT — VISUAL ACUITY
METHOD: SNELLEN - LINEAR
OD_PH_SC: 20/30
OD_SC: 20/25
OD_PH_SC+: -2
OS_SC: 20/60
OS_PH_SC: 20/25
OS_SC+: -2
OD_SC: 20/70
OS_SC: 20/25

## 2025-05-29 ASSESSMENT — CONF VISUAL FIELD
OD_SUPERIOR_TEMPORAL_RESTRICTION: 0
OS_NORMAL: 1
OS_SUPERIOR_TEMPORAL_RESTRICTION: 0
OS_INFERIOR_TEMPORAL_RESTRICTION: 0
OD_INFERIOR_TEMPORAL_RESTRICTION: 0
OS_INFERIOR_NASAL_RESTRICTION: 0
OS_SUPERIOR_NASAL_RESTRICTION: 0
OD_NORMAL: 1
OD_INFERIOR_NASAL_RESTRICTION: 0
OD_SUPERIOR_NASAL_RESTRICTION: 0

## 2025-05-29 ASSESSMENT — REFRACTION_MANIFEST
OD_CYLINDER: +0.50
OS_AXIS: 125
OD_SPHERE: -1.50
OD_CYLINDER: +0.50
OS_CYLINDER: +1.00
METHOD_AUTOREFRACTION: 1
OD_ADD: +2.50
OS_ADD: +2.50
OS_SPHERE: -2.00
OD_SPHERE: -1.50
OS_SPHERE: -2.00
OS_CYLINDER: +0.75
OD_AXIS: 060
OD_AXIS: 037
OS_AXIS: 120

## 2025-05-29 ASSESSMENT — KERATOMETRY
OS_K2POWER_DIOPTERS: 44.50
OS_AXISANGLE_DEGREES: 97
OD_K2POWER_DIOPTERS: 44.00
OD_K1POWER_DIOPTERS: 42.75
OD_AXISANGLE_DEGREES: 85
OS_K1POWER_DIOPTERS: 43.00
OS_AXISANGLE2_DEGREES: 7
OD_AXISANGLE2_DEGREES: 175

## 2025-05-29 ASSESSMENT — EXTERNAL EXAM - RIGHT EYE: OD_EXAM: NORMAL

## 2025-05-29 ASSESSMENT — SLIT LAMP EXAM - LIDS
COMMENTS: NORMAL
COMMENTS: NORMAL

## 2025-05-29 ASSESSMENT — REFRACTION_WEARINGRX
OD_AXIS: 060
OD_CYLINDER: +0.25
OD_SPHERE: -1.50
OS_CYLINDER: +0.75
OS_ADD: +2.50
OD_ADD: +2.50
SPECS_TYPE: LOST
OS_SPHERE: -2.00
OS_AXIS: 120

## 2025-05-29 ASSESSMENT — CUP TO DISC RATIO
OD_RATIO: 0.3
OS_RATIO: 0.4

## 2025-05-29 ASSESSMENT — EXTERNAL EXAM - LEFT EYE: OS_EXAM: NORMAL

## 2025-05-29 ASSESSMENT — TONOMETRY
IOP_METHOD: APPLANATION
OD_IOP_MMHG: 15
OS_IOP_MMHG: 15

## 2025-05-29 NOTE — PROGRESS NOTES
Chief Complaint   Patient presents with    Annual Eye Exam         Last Eye Exam: 2022  Dilated Previously: Yes    What are you currently using to see?  Had glasses but has been missing for over a year        Distance Vision Acuity: Noticed gradual change in both eyes    Near Vision Acuity: Satisfied with vision while reading and using computer unaided    Eye Comfort: dry, sore, itchy, and teary  Do you use eye drops? : Yes: allergy eye drops     Denariadne Nkechi - Optometric Assistant          Medical, surgical and family histories reviewed and updated 5/29/2025.       OBJECTIVE: See Ophthalmology exam    ASSESSMENT:    ICD-10-CM    1. Myopia, bilateral  H52.13 REFRACTION     EYE EXAM (SIMPLE-NONBILLABLE)      2. Regular astigmatism of both eyes  H52.223 REFRACTION     EYE EXAM (SIMPLE-NONBILLABLE)      3. Presbyopia  H52.4 REFRACTION     EYE EXAM (SIMPLE-NONBILLABLE)          PLAN:     Patient Instructions   Myopia is a result of long eyes. It is commonly referred to as near-sightedness. Seeing clearly in the distance is the main challenge.     Astigmatism results from curvature differential in the cornea and crystalline lens which can cause a distorted image, as light rays are prevented from meeting at a common focus.     Presbyopia is the diagnosis. Presbyopia is an age-related condition where the eye's crystalline lens doesn't change shape as easily as it once did.     Eyeglass prescription given.     The affects of the dilating drops last for 4- 6 hours.  You will be more sensitive to light and vision will be blurry up close.  Do not drive if you do not feel comfortable.  Mydriatic sunglasses were given if needed.     Recommend annual eye exams.     Nury Tidwell O.D.  [address] [address]

## 2025-05-29 NOTE — PATIENT INSTRUCTIONS
Myopia is a result of long eyes. It is commonly referred to as near-sightedness. Seeing clearly in the distance is the main challenge.     Astigmatism results from curvature differential in the cornea and crystalline lens which can cause a distorted image, as light rays are prevented from meeting at a common focus.     Presbyopia is the diagnosis. Presbyopia is an age-related condition where the eye's crystalline lens doesn't change shape as easily as it once did.     Eyeglass prescription given.     The affects of the dilating drops last for 4- 6 hours.  You will be more sensitive to light and vision will be blurry up close.  Do not drive if you do not feel comfortable.  Mydriatic sunglasses were given if needed.     Recommend annual eye exams.     Nury Tidwell O.D.  [facility]   [address] [place] [address]

## 2025-05-29 NOTE — LETTER
5/29/2025      Duran Santiago  [address]  [facility] [address]      Dear Colleague,    Thank you for referring your patient, Duran Santiago, to the [facility]. Please see a copy of my visit note below.    Chief Complaint   Patient presents with     Annual Eye Exam         Last Eye Exam: 2022  Dilated Previously: Yes    What are you currently using to see?  Had glasses but has been missing for over a year        Distance Vision Acuity: Noticed gradual change in both eyes    Near Vision Acuity: Satisfied with vision while reading and using computer unaided    Eye Comfort: dry, sore, itchy, and teary  Do you use eye drops? : Yes: allergy eye drops     Jasvir Nkechi - Optometric Assistant          Medical, surgical and family histories reviewed and updated 5/29/2025.       OBJECTIVE: See Ophthalmology exam    ASSESSMENT:    ICD-10-CM    1. Myopia, bilateral  H52.13 REFRACTION     EYE EXAM (SIMPLE-NONBILLABLE)      2. Regular astigmatism of both eyes  H52.223 REFRACTION     EYE EXAM (SIMPLE-NONBILLABLE)      3. Presbyopia  H52.4 REFRACTION     EYE EXAM (SIMPLE-NONBILLABLE)          PLAN:     Patient Instructions   Myopia is a result of long eyes. It is commonly referred to as near-sightedness. Seeing clearly in the distance is the main challenge.     Astigmatism results from curvature differential in the cornea and crystalline lens which can cause a distorted image, as light rays are prevented from meeting at a common focus.     Presbyopia is the diagnosis. Presbyopia is an age-related condition where the eye's crystalline lens doesn't change shape as easily as it once did.     Eyeglass prescription given.     The affects of the dilating drops last for 4- 6 hours.  You will be more sensitive to light and vision will be blurry up close.  Do not drive if you do not feel comfortable.  Mydriatic sunglasses were given if needed.     Recommend annual eye exams.     Nury Tidwell,  O.[facility]   [address] [place] [address]          Again, thank you for allowing me to participate in the care of your patient.        Sincerely,        Nury Tidwell OD    Electronically signed